# Patient Record
Sex: FEMALE | Race: BLACK OR AFRICAN AMERICAN | NOT HISPANIC OR LATINO | Employment: FULL TIME | URBAN - METROPOLITAN AREA
[De-identification: names, ages, dates, MRNs, and addresses within clinical notes are randomized per-mention and may not be internally consistent; named-entity substitution may affect disease eponyms.]

---

## 2017-04-04 ENCOUNTER — HOSPITAL ENCOUNTER (EMERGENCY)
Facility: HOSPITAL | Age: 47
Discharge: HOME | End: 2017-04-04
Attending: EMERGENCY MEDICINE
Payer: MEDICAID

## 2017-04-04 VITALS
DIASTOLIC BLOOD PRESSURE: 95 MMHG | HEIGHT: 67 IN | TEMPERATURE: 100 F | RESPIRATION RATE: 20 BRPM | SYSTOLIC BLOOD PRESSURE: 165 MMHG | WEIGHT: 290 LBS | BODY MASS INDEX: 45.52 KG/M2 | HEART RATE: 120 BPM

## 2017-04-04 DIAGNOSIS — J02.0 STREP PHARYNGITIS: Primary | ICD-10-CM

## 2017-04-04 LAB
B-HCG UR QL: NEGATIVE
BACTERIA #/AREA URNS AUTO: ABNORMAL /HPF
BILIRUB UR QL STRIP: NEGATIVE
CLARITY UR REFRACT.AUTO: CLEAR
COLOR UR AUTO: ABNORMAL
DEPRECATED S PYO AG THROAT QL EIA: POSITIVE
FLUAV AG SPEC QL IA: NEGATIVE
FLUBV AG SPEC QL IA: NEGATIVE
GLUCOSE UR QL STRIP: NEGATIVE
HGB UR QL STRIP: ABNORMAL
KETONES UR QL STRIP: NEGATIVE
LEUKOCYTE ESTERASE UR QL STRIP: ABNORMAL
MICROSCOPIC COMMENT: ABNORMAL
NITRITE UR QL STRIP: NEGATIVE
PH UR STRIP: 7 [PH] (ref 5–8)
PROT UR QL STRIP: NEGATIVE
RBC #/AREA URNS AUTO: 12 /HPF (ref 0–4)
SP GR UR STRIP: 1.01 (ref 1–1.03)
SPECIMEN SOURCE: NORMAL
SQUAMOUS #/AREA URNS AUTO: ABNORMAL /HPF
URN SPEC COLLECT METH UR: ABNORMAL
UROBILINOGEN UR STRIP-ACNC: NEGATIVE EU/DL
WBC #/AREA URNS AUTO: 3 /HPF (ref 0–5)

## 2017-04-04 PROCEDURE — 81000 URINALYSIS NONAUTO W/SCOPE: CPT

## 2017-04-04 PROCEDURE — 87400 INFLUENZA A/B EACH AG IA: CPT | Mod: 59

## 2017-04-04 PROCEDURE — 99283 EMERGENCY DEPT VISIT LOW MDM: CPT | Mod: 25

## 2017-04-04 PROCEDURE — 87880 STREP A ASSAY W/OPTIC: CPT

## 2017-04-04 PROCEDURE — 81025 URINE PREGNANCY TEST: CPT

## 2017-04-04 PROCEDURE — 63600175 PHARM REV CODE 636 W HCPCS: Performed by: EMERGENCY MEDICINE

## 2017-04-04 PROCEDURE — 96372 THER/PROPH/DIAG INJ SC/IM: CPT

## 2017-04-04 RX ORDER — HYDROCODONE BITARTRATE AND ACETAMINOPHEN 5; 325 MG/1; MG/1
1 TABLET ORAL EVERY 6 HOURS PRN
Qty: 12 TABLET | Refills: 0 | Status: SHIPPED | OUTPATIENT
Start: 2017-04-04 | End: 2018-07-10

## 2017-04-04 RX ADMIN — PENICILLIN G BENZATHINE 1.2 MILLION UNITS: 1200000 INJECTION, SUSPENSION INTRAMUSCULAR at 09:04

## 2017-04-04 NOTE — ED AVS SNAPSHOT
[facility]  [address]  [place] [address]               Hernando Lugo   2017  8:59 PM   ED    Description:  Female : 1970   Department:  [facility]           Your Care was Coordinated By:     Provider Role From To    Zandra Ziegler MD Attending Provider 17 --      Reason for Visit     Abdominal Pain           Diagnoses this Visit        Comments    Strep pharyngitis    -  Primary       ED Disposition     ED Disposition Condition Comment    Discharge             To Do List           Follow-up Information     Follow up with [facility] In 1 week(s).    Specialties:  Neurosurgery, Plastic Surgery, Podiatry, Surgical Oncology, Allergy, Cardiothoracic Surgery, Otolaryngology, Gastroenterology, Breast Surgery, Oral Surgery, Oral and Maxillofacial Surgery, Cardiology, Bariatrics, Internal Medicine, Family Medicine, Colon and Rectal Surgery, Dental General Practice, Gynecology, Orthopedic Surgery, Genetics, Endocrinology, Vascular Surgery, Physical Medicine and Rehabilitation, Urology, Neurology, Dermatology, Rheumatology    Contact information:    [address] [place] [address]  [phone]         These Medications        Disp Refills Start End    hydrocodone-acetaminophen 5-325mg (NORCO) 5-325 mg per tablet 12 tablet 0 2017     Take 1 tablet by mouth every 6 (six) hours as needed for Pain. - Oral      [facility] On Call     [facility] On Call Nurse Care Line - 24/ Assistance  Unless otherwise directed by your provider, please contact [facility] On-Call, our nurse care line that is available for / assistance.     Registered nurses in the [facility] provide: appointment scheduling, clinical advisement, health education, and other advisory services.  Call: [phone] (toll free)               Medications           START taking these NEW medications        Refills     hydrocodone-acetaminophen 5-325mg (NORCO) 5-325 mg per tablet 0    Sig: Take 1 tablet by mouth every 6 (six) hours as needed for Pain.    Class: Print    Route: Oral      These medications were administered today        Dose Freq    penicillin G benzathine (BICILLIN LA) injection 1.2 Million Units 1.2 Million Units ED 1 Time    Sig: Inject 2 mLs (1.2 Million Units total) into the muscle ED 1 Time.    Class: Normal    Route: Intramuscular      STOP taking these medications     lisinopril (PRINIVIL,ZESTRIL) 40 MG tablet Take 50 mg by mouth once daily.    ranitidine (ZANTAC) 150 MG tablet Take 150 mg by mouth 2 (two) times daily.    ketorolac (TORADOL) 10 mg tablet Take 1 tablet (10 mg total) by mouth every 6 (six) hours.    meloxicam (MOBIC) 15 MG tablet Take 1 tablet (15 mg total) by mouth once daily. With a meal for joint pain    ibuprofen (ADVIL,MOTRIN) 800 MG tablet Take 1 tablet (800 mg total) by mouth every 6 (six) hours as needed for Pain.           Verify that the below list of medications is an accurate representation of the medications you are currently taking.  If none reported, the list may be blank. If incorrect, please contact your healthcare provider. Carry this list with you in case of emergency.           Current Medications     gabapentin (NEURONTIN) 100 MG capsule One po hs for 3 days then 2 hs    hydrochlorothiazide (HYDRODIURIL) 25 MG tablet Take 25 mg by mouth once daily.     losartan (COZAAR) 100 MG tablet Take 100 mg by mouth once daily.    acetaminophen (TYLENOL) 500 MG tablet Take 2 tablets (1,000 mg total) by mouth 3 (three) times daily as needed for Pain.    hydrocodone-acetaminophen 5-325mg (NORCO) 5-325 mg per tablet Take 1 tablet by mouth every 6 (six) hours as needed for Pain.           Clinical Reference Information           Your Vitals Were     BP Pulse Temp Resp Height Weight    165/95 (BP Location: Right arm, Patient Position: Sitting, BP Method: Automatic) 120 100.4 °F (38 °C) 20  "5' 7" (1.702 m) 131.5 kg (290 lb)    BMI                45.42 kg/m2          Allergies as of 4/4/2017     No Known Allergies      Immunizations Administered on Date of Encounter - 4/4/2017     None      ED Micro, Lab, POCT     Start Ordered       Status Ordering Provider    04/04/17 2121 04/04/17 2120  Rapid strep screen  STAT      Final result     04/04/17 2121 04/04/17 2120  Influenza antigen Nasopharyngeal Swab  STAT      Final result     04/04/17 2104 04/04/17 2103  Urinalysis  STAT      Final result     04/04/17 2104 04/04/17 2103  UPT (Pregnancy, urine rapid)  STAT      Final result     04/04/17 2103 04/04/17 2103  Urinalysis Microscopic  Once      Final result       ED Imaging Orders     None        Discharge Instructions           Pharyngitis: Strep (Confirmed)       You have had a positive test for strep throat. Strep throat is a contagious illness. It is spread by coughing, kissing or by touching others after touching your mouth or nose. Symptoms include throat pain which is worse with swallowing, aching all over, headache and fever. It is treated with antibiotic medication. This should help you start to feel better within 1-2 days.  Home care  · Rest at home. Drink plenty of fluids to avoid dehydration.  · No work or school for the first 2 days of taking the antibiotics. After this time, you will not be contagious. You can then return to school or work if you are feeling better.   · The antibiotic medication must be taken for the full 10 days, even if you feel better. This is very important to ensure the infection is treated. It is also important to prevent drug-resistent organisms from developing. If you were given an antibiotic shot, no more antibiotics are needed.  · You may use acetaminophen (Tylenol) or ibuprofen (Motrin, Advil) to control pain or fever, unless another medicine was prescribed for this. (NOTE: If you have chronic liver or kidney disease or ever had a stomach ulcer or GI bleeding, talk " with your doctor before using these medicines.)  · Throat lozenges or sprays (such as Chloraseptic) help reduce pain. Gargling with warm salt water will also reduce throat pain. Dissolve 1/2 teaspoon of salt in 1 glass of warm water. This may be useful just before meals.   · Soft foods are okay. Avoid salty or spicy foods.  Follow-up care  Follow up with your healthcare provider or our staff if you are not improving over the next week.  When to seek medical advice  Call your healthcare provider right away if any of these occur:  · Fever as directed by your doctor   · New or worsening ear pain, sinus pain, or headache  · Painful lumps in the back of neck  · Stiff neck  · Lymph nodes are getting larger or becoming soft in the middle  · Inability to swallow liquids, excessive drooling, or inability to open mouth wide due to throat pain  · Signs of dehydration (very dark urine or no urine, sunken eyes, dizziness)  · Trouble breathing or noisy breathing  · Muffled voice  · New rash  Date Last Reviewed: 4/13/2015  © 2012-8691 [employer]. [address] [place] [address]. All rights reserved. This information is not intended as a substitute for professional medical care. Always follow your healthcare professional's instructions.          [facility] Sign-Up     Activating your [facility] account is as easy as 1-2-3!     1) Visit [email or web address], select Sign Up Now, enter this activation code and your date of birth, then select Next.  [email or web address]  Expires: 5/19/2017 10:08 PM      2) Create a username and password to use when you visit [facility] in the future and select a security question in case you lose your password and select Next.    3) Enter your e-mail address and click Sign Up!    Additional Information  If you have questions, please e-mail [email or web address] or call [phone] to talk to our [facility] staff. Remember, [facility] is NOT to be used for urgent needs. For medical  emergencies, dial 911.          [facility] complies with applicable Federal civil rights laws and does not discriminate on the basis of race, color, national origin, age, disability, or sex.        Language Assistance Services     ATTENTION: Language assistance services are available, free of charge. Please call [phone].      ATENCIÓN: Si habla español, tiene a hartman disposición servicios gratuitos de asistencia lingüística. Llame al [phone].     SIMON Ý: N?u b?n nói Ti?ng Vi?t, có các d?ch v? h? tr? ngôn ng? mi?n phí dành cho b?n. G?i s? [phone].

## 2017-04-05 NOTE — DISCHARGE INSTRUCTIONS
Pharyngitis: Strep (Confirmed)       You have had a positive test for strep throat. Strep throat is a contagious illness. It is spread by coughing, kissing or by touching others after touching your mouth or nose. Symptoms include throat pain which is worse with swallowing, aching all over, headache and fever. It is treated with antibiotic medication. This should help you start to feel better within 1-2 days.  Home care  · Rest at home. Drink plenty of fluids to avoid dehydration.  · No work or school for the first 2 days of taking the antibiotics. After this time, you will not be contagious. You can then return to school or work if you are feeling better.   · The antibiotic medication must be taken for the full 10 days, even if you feel better. This is very important to ensure the infection is treated. It is also important to prevent drug-resistent organisms from developing. If you were given an antibiotic shot, no more antibiotics are needed.  · You may use acetaminophen (Tylenol) or ibuprofen (Motrin, Advil) to control pain or fever, unless another medicine was prescribed for this. (NOTE: If you have chronic liver or kidney disease or ever had a stomach ulcer or GI bleeding, talk with your doctor before using these medicines.)  · Throat lozenges or sprays (such as Chloraseptic) help reduce pain. Gargling with warm salt water will also reduce throat pain. Dissolve 1/2 teaspoon of salt in 1 glass of warm water. This may be useful just before meals.   · Soft foods are okay. Avoid salty or spicy foods.  Follow-up care  Follow up with your healthcare provider or our staff if you are not improving over the next week.  When to seek medical advice  Call your healthcare provider right away if any of these occur:  · Fever as directed by your doctor   · New or worsening ear pain, sinus pain, or headache  · Painful lumps in the back of neck  · Stiff neck  · Lymph nodes are getting larger or becoming soft in the  middle  · Inability to swallow liquids, excessive drooling, or inability to open mouth wide due to throat pain  · Signs of dehydration (very dark urine or no urine, sunken eyes, dizziness)  · Trouble breathing or noisy breathing  · Muffled voice  · New rash  Date Last Reviewed: 4/13/2015  © 1528-6377 [employer]. [address] [place] [address], [place] [address]. All rights reserved. This information is not intended as a substitute for professional medical care. Always follow your healthcare professional's instructions.

## 2017-04-05 NOTE — ED PROVIDER NOTES
"Encounter Date: 4/4/2017       History     Chief Complaint   Patient presents with    Abdominal Pain     Epigastric abdominal pain "burning" since 1200 today, no n/v/d.  Also c/o headache and bilateral posterior lower extremity pain, no hx of recent trauma     Review of patient's allergies indicates:  No Known Allergies  HPI Comments: Patient states that she's been having a sore throat since this morning.  She states that this is causing her to have trouble swallowing and eating and from this she is having abdominal pain because she is hungry.    Patient is a 46 y.o. female presenting with the following complaint: sore throat. The history is provided by the patient.   Sore Throat   This is a new problem. The current episode started 6 to 12 hours ago. The problem occurs constantly. The problem has not changed since onset.Pertinent negatives include no chest pain, no abdominal pain, no headaches and no shortness of breath. The symptoms are aggravated by swallowing. Nothing relieves the symptoms.     Past Medical History:   Diagnosis Date    GERD (gastroesophageal reflux disease)     Hypertension      Past Surgical History:   Procedure Laterality Date    CHOLECYSTECTOMY      TUBAL LIGATION       History reviewed. No pertinent family history.  Social History   Substance Use Topics    Smoking status: Never Smoker    Smokeless tobacco: None    Alcohol use No     Review of Systems   HENT: Positive for sore throat.    Respiratory: Negative for shortness of breath.    Cardiovascular: Negative for chest pain.   Gastrointestinal: Negative for abdominal pain.   Neurological: Negative for headaches.   All other systems reviewed and are negative.      Physical Exam   Initial Vitals   BP Pulse Resp Temp SpO2   04/04/17 2100 04/04/17 2103 04/04/17 2103 04/04/17 2103 --   153/104 120 20 100.4 °F (38 °C)      Physical Exam    Nursing note and vitals reviewed.  Constitutional: She appears well-developed and well-nourished. "   HENT:   Head: Normocephalic and atraumatic.   Mouth/Throat: Oropharyngeal exudate, posterior oropharyngeal edema and posterior oropharyngeal erythema present.   Eyes: EOM are normal.   Neck: Normal range of motion. Neck supple.   Cardiovascular: Normal rate, regular rhythm, normal heart sounds and intact distal pulses.   Pulmonary/Chest: Breath sounds normal.   Abdominal: Soft.   Musculoskeletal: Normal range of motion.   Neurological: She is alert and oriented to person, place, and time.   Skin: Skin is warm and dry.   Psychiatric: She has a normal mood and affect. Her behavior is normal. Judgment and thought content normal.         ED Course   Procedures  Labs Reviewed   THROAT SCREEN, RAPID - Abnormal; Notable for the following:        Result Value    Rapid Strep A Screen Positive (*)     All other components within normal limits   URINALYSIS - Abnormal; Notable for the following:     Occult Blood UA 3+ (*)     Leukocytes, UA 2+ (*)     All other components within normal limits   URINALYSIS MICROSCOPIC - Abnormal; Notable for the following:     RBC, UA 12 (*)     All other components within normal limits   PREGNANCY TEST, URINE RAPID   INFLUENZA A AND B ANTIGEN             Medical Decision Making:   Clinical Tests:   Lab Tests: Ordered and Reviewed                   ED Course     Clinical Impression:   The encounter diagnosis was Strep pharyngitis.    Disposition:   Disposition: Discharged  Condition: Stable       Zandra Ziegler MD  04/04/17 5393

## 2017-07-15 ENCOUNTER — HOSPITAL ENCOUNTER (EMERGENCY)
Facility: HOSPITAL | Age: 47
Discharge: HOME | End: 2017-07-16
Attending: EMERGENCY MEDICINE
Payer: MEDICAID

## 2017-07-15 VITALS
TEMPERATURE: 98 F | HEIGHT: 66 IN | BODY MASS INDEX: 46.61 KG/M2 | DIASTOLIC BLOOD PRESSURE: 95 MMHG | HEART RATE: 75 BPM | OXYGEN SATURATION: 98 % | SYSTOLIC BLOOD PRESSURE: 179 MMHG | WEIGHT: 290 LBS | RESPIRATION RATE: 20 BRPM

## 2017-07-15 DIAGNOSIS — J02.9 VIRAL PHARYNGITIS: Primary | ICD-10-CM

## 2017-07-15 LAB — DEPRECATED S PYO AG THROAT QL EIA: NEGATIVE

## 2017-07-15 PROCEDURE — 63600175 PHARM REV CODE 636 W HCPCS: Performed by: EMERGENCY MEDICINE

## 2017-07-15 PROCEDURE — 87081 CULTURE SCREEN ONLY: CPT

## 2017-07-15 PROCEDURE — 87880 STREP A ASSAY W/OPTIC: CPT

## 2017-07-15 PROCEDURE — 96372 THER/PROPH/DIAG INJ SC/IM: CPT

## 2017-07-15 PROCEDURE — 99283 EMERGENCY DEPT VISIT LOW MDM: CPT | Mod: 25

## 2017-07-15 RX ORDER — METFORMIN HYDROCHLORIDE 500 MG/1
500 TABLET ORAL 2 TIMES DAILY WITH MEALS
COMMUNITY

## 2017-07-15 RX ORDER — DEXAMETHASONE SODIUM PHOSPHATE 4 MG/ML
8 INJECTION, SOLUTION INTRA-ARTICULAR; INTRALESIONAL; INTRAMUSCULAR; INTRAVENOUS; SOFT TISSUE
Status: COMPLETED | OUTPATIENT
Start: 2017-07-15 | End: 2017-07-15

## 2017-07-15 RX ADMIN — DEXAMETHASONE SODIUM PHOSPHATE 8 MG: 4 INJECTION, SOLUTION INTRAMUSCULAR; INTRAVENOUS at 11:07

## 2017-07-16 NOTE — ED PROVIDER NOTES
Encounter Date: 7/15/2017       History     Chief Complaint   Patient presents with    Sore Throat    Nasal Congestion     The history is provided by the patient.   Sore Throat   This is a new problem. The current episode started yesterday. The problem occurs constantly. The problem has not changed since onset.Pertinent negatives include no chest pain, no abdominal pain, no headaches and no shortness of breath. The symptoms are aggravated by swallowing and coughing. Nothing relieves the symptoms. She has tried nothing for the symptoms.     Review of patient's allergies indicates:  No Known Allergies  Past Medical History:   Diagnosis Date    Diabetes mellitus     GERD (gastroesophageal reflux disease)     Hypertension      Past Surgical History:   Procedure Laterality Date    CHOLECYSTECTOMY      TUBAL LIGATION       History reviewed. No pertinent family history.  Social History   Substance Use Topics    Smoking status: Never Smoker    Smokeless tobacco: Never Used    Alcohol use No     Review of Systems   HENT: Positive for sore throat.    Respiratory: Negative for shortness of breath.    Cardiovascular: Negative for chest pain.   Gastrointestinal: Negative for abdominal pain.   Neurological: Negative for headaches.   All other systems reviewed and are negative.      Physical Exam     Initial Vitals [07/15/17 2238]   BP Pulse Resp Temp SpO2   (!) 164/100 78 20 98.2 °F (36.8 °C) 98 %      MAP       121.33         Physical Exam    Nursing note and vitals reviewed.  Constitutional: She appears well-developed and well-nourished.   HENT:   Head: Normocephalic and atraumatic.   Mouth/Throat: Posterior oropharyngeal edema present. No oropharyngeal exudate, posterior oropharyngeal erythema or tonsillar abscesses.   Eyes: EOM are normal.   Neck: Normal range of motion. Neck supple.   Cardiovascular: Normal rate, regular rhythm, normal heart sounds and intact distal pulses.   Pulmonary/Chest: Breath sounds normal.    Abdominal: Soft.   Musculoskeletal: Normal range of motion.   Neurological: She is alert and oriented to person, place, and time.   Skin: Skin is warm and dry. Capillary refill takes less than 2 seconds.   Psychiatric: She has a normal mood and affect. Her behavior is normal. Judgment and thought content normal.         ED Course   Procedures  Labs Reviewed   THROAT SCREEN, RAPID   CULTURE, STREP A,  THROAT             Medical Decision Making:   Clinical Tests:   Lab Tests: Ordered and Reviewed                   ED Course     Clinical Impression:   The encounter diagnosis was Viral pharyngitis.    Disposition:   Disposition: Discharged  Condition: Stable                        Zandra Ziegler MD  07/15/17 7267

## 2017-07-18 LAB — BACTERIA THROAT CULT: NORMAL

## 2018-01-19 ENCOUNTER — HOSPITAL ENCOUNTER (EMERGENCY)
Facility: HOSPITAL | Age: 48
Discharge: HOME | End: 2018-01-19
Attending: EMERGENCY MEDICINE
Payer: MEDICAID

## 2018-01-19 VITALS
TEMPERATURE: 97 F | DIASTOLIC BLOOD PRESSURE: 95 MMHG | BODY MASS INDEX: 57.52 KG/M2 | WEIGHT: 293 LBS | HEIGHT: 60 IN | HEART RATE: 71 BPM | SYSTOLIC BLOOD PRESSURE: 181 MMHG | RESPIRATION RATE: 20 BRPM | OXYGEN SATURATION: 100 %

## 2018-01-19 DIAGNOSIS — R07.9 CHEST PAIN: ICD-10-CM

## 2018-01-19 DIAGNOSIS — E66.01 MORBID OBESITY: ICD-10-CM

## 2018-01-19 DIAGNOSIS — I10 POORLY-CONTROLLED HYPERTENSION: Primary | ICD-10-CM

## 2018-01-19 LAB
B-HCG UR QL: NEGATIVE
D DIMER PPP FEU-MCNC: 232 NG/ML

## 2018-01-19 PROCEDURE — 99284 EMERGENCY DEPT VISIT MOD MDM: CPT

## 2018-01-19 PROCEDURE — 85379 FIBRIN DEGRADATION QUANT: CPT

## 2018-01-19 PROCEDURE — 81025 URINE PREGNANCY TEST: CPT

## 2018-01-19 PROCEDURE — 93005 ELECTROCARDIOGRAM TRACING: CPT

## 2018-01-19 PROCEDURE — 93010 ELECTROCARDIOGRAM REPORT: CPT | Mod: ,,, | Performed by: INTERNAL MEDICINE

## 2018-01-19 RX ORDER — HYDROCHLOROTHIAZIDE 25 MG/1
25 TABLET ORAL DAILY
Qty: 30 TABLET | Refills: 0 | Status: SHIPPED | OUTPATIENT
Start: 2018-01-19 | End: 2022-05-11 | Stop reason: SDUPTHER

## 2018-01-19 RX ORDER — LOSARTAN POTASSIUM 100 MG/1
100 TABLET ORAL DAILY
Qty: 30 TABLET | Refills: 0 | Status: SHIPPED | OUTPATIENT
Start: 2018-01-19 | End: 2021-07-25 | Stop reason: CLARIF

## 2018-01-19 NOTE — ED PROVIDER NOTES
Encounter Date: 1/19/2018       History     Chief Complaint   Patient presents with    Abdominal Pain     Intermittent sharp epigastric pain since yesterday, worse with lying down, nothing makes better.  Denies any n/v/d, no cough/fever at home.  Pt has been noncompliant with RX for greater than a week    Leg Pain     Bilateral LE pain that started same time as epigastric pain, no swelling/redness, able to ambulate without difficulty     This patient is a 47-year-old female.  Presents to the emergency department complaining of pain in the right calf, and in her chest.  Started at 5 PM this evening while she was driving.  No prior history of DVT or PE.  The pain in her right leg is a sharp pain.  No associated swelling.  Pain in her chest is in the retrosternal region.  No associated diaphoresis, palpitations, shortness of breath, lightheadedness, or loss of consciousness.    She is morbidly obese.  No history of DVT or PE previously.  No recent surgery or trauma.  No other identified risk factors for DVT or PE.  No history of heart disease.            Review of patient's allergies indicates:  No Known Allergies  Past Medical History:   Diagnosis Date    Diabetes mellitus     GERD (gastroesophageal reflux disease)     Hypertension      Past Surgical History:   Procedure Laterality Date    CHOLECYSTECTOMY      TUBAL LIGATION       History reviewed. No pertinent family history.  Social History   Substance Use Topics    Smoking status: Never Smoker    Smokeless tobacco: Never Used    Alcohol use No     Review of Systems   Constitutional: Negative for chills and fever.   HENT: Negative.    Respiratory: Negative for cough and shortness of breath.    Cardiovascular: Positive for chest pain. Negative for palpitations and leg swelling.   Gastrointestinal: Negative.    Endocrine: Negative.    Genitourinary: Negative.    Musculoskeletal:        Right calf/leg pain   Skin: Negative for rash.   Neurological: Negative.     Hematological: Negative.        Physical Exam     Initial Vitals [01/19/18 0338]   BP Pulse Resp Temp SpO2   -- 79 18 98.5 °F (36.9 °C) 100 %      MAP       --         Physical Exam    Nursing note and vitals reviewed.  Constitutional: She appears well-developed and well-nourished. She is not diaphoretic. No distress.   Morbidly obese female in no apparent distress   HENT:   Head: Normocephalic and atraumatic.   Right Ear: External ear normal.   Left Ear: External ear normal.   Mouth/Throat: Oropharynx is clear and moist.   Eyes: Conjunctivae are normal. Pupils are equal, round, and reactive to light. No scleral icterus.   Neck:   Cannot see neck veins due to body habitus   Cardiovascular: Normal rate, regular rhythm, normal heart sounds and intact distal pulses.   No murmur heard.  Pulmonary/Chest: Breath sounds normal. No respiratory distress. She has no wheezes. She exhibits tenderness.   Abdominal: Soft. She exhibits no distension. There is no tenderness.   Musculoskeletal: She exhibits no edema or tenderness.   Obese lower extremities but no pitting edema   Neurological: She is alert. She has normal strength. No sensory deficit.   Skin: Skin is warm and dry. No rash noted.         ED Course   Procedures  Labs Reviewed   D DIMER - Abnormal; Notable for the following:        Result Value    D-Dimer 232 (*)     All other components within normal limits   PREGNANCY TEST, URINE RAPID     EKG Readings: (Independently Interpreted)   I independently reviewed the EKG tracing.  It shows normal sinus rhythm, heart rate 71, no acute ST elevation or depression, normal EKG       X-Rays:   Independently Interpreted Readings:   Other Readings:  I independently reviewed the chest x-ray.  Heart size is normal, no infiltrate or effusion, no evidence of pneumothorax    Medical Decision Making:   Initial Assessment:   This patient is morbidly obese, and presents to the emergency department complaining of right calf pain, and  onset of chest pain, started today.  Her d-dimer is 232, essentially normal, so CTA of the chest or ultrasound leg are not necessary.  The patient says that the primary reason she came to the hospital today was because she ran out of her blood pressure medication, and missed her primary care appointment to get a medication refill, so a refill of losartan HCTZ were provided.  He should follow-up with her primary care doctor within the next month for further medication refills and have her blood pressure rechecked                   ED Course as of Jan 19 0505   Fri Jan 19, 2018   0434 D-Dimer: (!) 232 [RS]      ED Course User Index  [RS] Ryder Keyes MD     Clinical Impression:   The primary encounter diagnosis was Poorly-controlled hypertension. Diagnoses of Chest pain and Morbid obesity were also pertinent to this visit.    Disposition:   Disposition: Discharged  Condition: Stable                        Ryder Keyes MD  01/19/18 [id number]

## 2018-01-19 NOTE — ED NOTES
Pt resting quietly, NAD noted, respirations even/unlabored, does not appear to be in any discomfort.  RT notified of need for EKG, will continue to closely monitor.

## 2018-07-10 ENCOUNTER — HOSPITAL ENCOUNTER (EMERGENCY)
Facility: HOSPITAL | Age: 48
Discharge: HOME | End: 2018-07-10
Attending: FAMILY MEDICINE
Payer: MEDICAID

## 2018-07-10 VITALS
WEIGHT: 293 LBS | RESPIRATION RATE: 16 BRPM | DIASTOLIC BLOOD PRESSURE: 71 MMHG | TEMPERATURE: 98 F | OXYGEN SATURATION: 100 % | HEIGHT: 63 IN | HEART RATE: 92 BPM | SYSTOLIC BLOOD PRESSURE: 143 MMHG | BODY MASS INDEX: 51.91 KG/M2

## 2018-07-10 DIAGNOSIS — H11.32 SUBCONJUNCTIVAL HEMORRHAGE OF LEFT EYE: Primary | ICD-10-CM

## 2018-07-10 PROCEDURE — 99283 EMERGENCY DEPT VISIT LOW MDM: CPT

## 2018-07-10 RX ORDER — AMLODIPINE BESYLATE 5 MG/1
5 TABLET ORAL DAILY
COMMUNITY
End: 2019-05-09

## 2018-07-10 RX ORDER — AMLODIPINE BESYLATE 10 MG/1
10 TABLET ORAL DAILY
COMMUNITY
End: 2022-05-11 | Stop reason: SDUPTHER

## 2018-07-11 NOTE — ED NOTES
Pt stated she cried a lot on yesterday d/t death of a loved one at the hospital; pt stated her left eye was a little red on yesterday & today has worsen with throbbing rate pain 7/10; pt stated she didn't take anything for pain; denies any visual changes; no drainage noted;

## 2018-07-11 NOTE — ED PROVIDER NOTES
Encounter Date: 7/10/2018       History     Chief Complaint   Patient presents with    Eye Problem     Broken blood vessel in the left side of left eye. Crying a lot yesterday due to death of family member. +throbbing. Denies decrease in vision     47-year-old female presents  to the emergency department for evaluation of 2 day history of left eye redness.  She states that she noticed a small area of redness last night after crying.  She reports that this morning the redness seems to have gotten bigger.  She denies any eye pain, vision changes, discharge, redness/swelling/pain to the upper or lower eyelids.  She states that her uncle  last night and she and her family were crying hard for several hours.  No treatment  was attempted prior to arrival.  She denies wearing contact lenses or glasses.          Review of patient's allergies indicates:  No Known Allergies  Past Medical History:   Diagnosis Date    Diabetes mellitus     GERD (gastroesophageal reflux disease)     Hypertension      Past Surgical History:   Procedure Laterality Date    CHOLECYSTECTOMY      TUBAL LIGATION       No family history on file.  Social History   Substance Use Topics    Smoking status: Never Smoker    Smokeless tobacco: Never Used    Alcohol use No     Review of Systems   Constitutional: Negative for activity change, appetite change and fever.   HENT: Negative for congestion, ear discharge, ear pain, facial swelling, mouth sores, sinus pressure, sore throat and trouble swallowing.    Eyes: Positive for redness. Negative for photophobia, pain, discharge, itching and visual disturbance.   Respiratory: Negative for cough and shortness of breath.    Cardiovascular: Negative for chest pain.   Gastrointestinal: Negative for abdominal pain, constipation, diarrhea, nausea and vomiting.   Genitourinary: Negative for dysuria, flank pain, frequency and pelvic pain.   Musculoskeletal: Negative for back pain and neck pain.   Skin:  Negative for rash.   Neurological: Negative for dizziness, syncope, weakness, light-headedness, numbness and headaches.       Physical Exam     Initial Vitals [07/10/18 2004]   BP Pulse Resp Temp SpO2   (!) 143/71 92 16 98.1 °F (36.7 °C) 100 %      MAP       --         Physical Exam    Nursing note and vitals reviewed.  Constitutional: She appears well-developed and well-nourished. She is not diaphoretic. No distress.   HENT:   Head: Normocephalic and atraumatic.   Right Ear: External ear normal.   Left Ear: External ear normal.   Nose: Nose normal.   Mouth/Throat: Oropharynx is clear and moist.   Eyes: EOM and lids are normal. Pupils are equal, round, and reactive to light. Right eye exhibits no chemosis, no discharge and no exudate. No foreign body present in the right eye. Left eye exhibits no chemosis, no discharge and no exudate. No foreign body present in the left eye. Right conjunctiva is not injected. Right conjunctiva has no hemorrhage. Left conjunctiva is not injected. Left conjunctiva has a hemorrhage.       Neck: Normal range of motion. Neck supple.   Cardiovascular: Normal rate, regular rhythm and normal heart sounds. Exam reveals no gallop and no friction rub.    No murmur heard.  Pulmonary/Chest: Breath sounds normal. No respiratory distress. She has no wheezes. She has no rhonchi. She has no rales. She exhibits no tenderness.   Abdominal: Soft. Bowel sounds are normal. There is no tenderness.   Lymphadenopathy:     She has no cervical adenopathy.   Neurological: She is alert and oriented to person, place, and time. No cranial nerve deficit.   Skin: Skin is warm and dry.   Psychiatric: She has a normal mood and affect.         ED Course   Procedures  Labs Reviewed - No data to display       Imaging Results    None          Medical Decision Making:   Initial Assessment:   47-year-old female presents for evaluation of left eye redness.  Physical exam reveals a nontoxic-appearing female in no acute  distress.  Patient is afebrile vital signs are within normal limits.  Neurological exam reveals an alert and oriented patient.  No periorbital erythema or edema noted.  Examination of the eyes reveals a small subconjunctival hemorrhage noted to the lateral aspect of the left eye.  No hyphema or anterior chamber bulge noted.  No foreign bodies noted.  Lungs clear to auscultation bilaterally.  Differential Diagnosis:   Subconjunctival hemorrhage.    I carefully considered but doubt serious eye etiology or injury including hyphema, open globe, embedded foreign body or periorbital cellulitis  ED Management:  Discussed this finding at length with the patient verbalizes understanding and agreement course of treatment.  Instructed patient to follow up with her primary care provider for reevaluation within 3 days.  She was instructed to return to the emergency department immediately for any new or worsening symptoms.                      Clinical Impression:   The encounter diagnosis was Subconjunctival hemorrhage of left eye.                             Christina Lozano PA-C  07/10/18 8946

## 2018-07-11 NOTE — DISCHARGE INSTRUCTIONS
You were found to have a subconjunctival hemorrhage of the left eye.  This will resolve without treatment in 1-2 weeks.  You are instructed to follow-up with your primary care provider for reevaluation.  You are instructed to return to the emergency department immediately for any new or worsening symptoms.

## 2019-05-09 ENCOUNTER — HOSPITAL ENCOUNTER (EMERGENCY)
Facility: HOSPITAL | Age: 49
Discharge: HOME | End: 2019-05-09
Attending: SURGERY
Payer: MEDICAID

## 2019-05-09 VITALS
SYSTOLIC BLOOD PRESSURE: 174 MMHG | DIASTOLIC BLOOD PRESSURE: 88 MMHG | TEMPERATURE: 98 F | HEART RATE: 62 BPM | OXYGEN SATURATION: 97 % | RESPIRATION RATE: 18 BRPM

## 2019-05-09 DIAGNOSIS — S16.1XXA ACUTE STRAIN OF NECK MUSCLE, INITIAL ENCOUNTER: Primary | ICD-10-CM

## 2019-05-09 DIAGNOSIS — I16.0 HYPERTENSIVE URGENCY: ICD-10-CM

## 2019-05-09 LAB — POCT GLUCOSE: 109 MG/DL (ref 70–110)

## 2019-05-09 PROCEDURE — 99284 EMERGENCY DEPT VISIT MOD MDM: CPT | Mod: ER

## 2019-05-09 PROCEDURE — 25000003 PHARM REV CODE 250: Mod: ER | Performed by: SURGERY

## 2019-05-09 PROCEDURE — 82962 GLUCOSE BLOOD TEST: CPT | Mod: ER

## 2019-05-09 RX ORDER — CLONIDINE HYDROCHLORIDE 0.2 MG/1
0.2 TABLET ORAL
Status: COMPLETED | OUTPATIENT
Start: 2019-05-09 | End: 2019-05-09

## 2019-05-09 RX ORDER — HYDROCODONE BITARTRATE AND ACETAMINOPHEN 7.5; 325 MG/1; MG/1
1 TABLET ORAL EVERY 6 HOURS PRN
Qty: 10 TABLET | Refills: 0 | Status: SHIPPED | OUTPATIENT
Start: 2019-05-09

## 2019-05-09 RX ORDER — HYDROCODONE BITARTRATE AND ACETAMINOPHEN 10; 325 MG/1; MG/1
1 TABLET ORAL
Status: COMPLETED | OUTPATIENT
Start: 2019-05-09 | End: 2019-05-09

## 2019-05-09 RX ORDER — CLONIDINE HYDROCHLORIDE 0.1 MG/1
0.2 TABLET ORAL 2 TIMES DAILY
Qty: 30 TABLET | Refills: 0 | Status: SHIPPED | OUTPATIENT
Start: 2019-05-09 | End: 2020-05-08

## 2019-05-09 RX ADMIN — CLONIDINE HYDROCHLORIDE 0.2 MG: 0.2 TABLET ORAL at 04:05

## 2019-05-09 RX ADMIN — HYDROCODONE BITARTRATE AND ACETAMINOPHEN 1 TABLET: 10; 325 TABLET ORAL at 04:05

## 2019-05-09 NOTE — ED PROVIDER NOTES
Encounter Date: 5/9/2019       History     Chief Complaint   Patient presents with    Neck Pain     left sided neck pain started at 12 today. Denies injury.     Hypertension     Patient has left-sided neck tightness which started at noon today which she thinks is due to a muscle strain   she can't move her neck to the left or to the right without discomfort.  She has a history of hypertension she takes losartan in the morning and amlodipine at night she does not have a headache or chest pain    The history is provided by the patient.   Neck Pain    This is a new problem. The current episode started 3 to 5 hours ago. The problem occurs intermittently. The problem has been unchanged. The pain is associated with a recent injury. The pain is present in the left side. The quality of the pain is described as stabbing. The pain radiates to the left shoulder. The symptoms are aggravated by bending, twisting and position. She has tried nothing for the symptoms.   Hypertension    Associated symptoms include neck pain.     Review of patient's allergies indicates:  No Known Allergies  Past Medical History:   Diagnosis Date    Diabetes mellitus     GERD (gastroesophageal reflux disease)     Hypertension      Past Surgical History:   Procedure Laterality Date    CHOLECYSTECTOMY      TUBAL LIGATION       History reviewed. No pertinent family history.  Social History     Tobacco Use    Smoking status: Never Smoker    Smokeless tobacco: Never Used   Substance Use Topics    Alcohol use: No    Drug use: No     Review of Systems   Constitutional: Negative.    HENT: Negative.    Eyes: Negative.    Respiratory: Negative.  Negative for chest tightness.    Gastrointestinal: Negative.    Endocrine: Negative.    Genitourinary: Negative.    Musculoskeletal: Positive for neck pain.   Skin: Negative.    Allergic/Immunologic: Negative.    Neurological: Negative.    Hematological: Negative.    Psychiatric/Behavioral: Negative.         Physical Exam     Initial Vitals [05/09/19 1549]   BP Pulse Resp Temp SpO2   (!) 212/105 71 16 98 °F (36.7 °C) 100 %      MAP       --         Physical Exam    Nursing note and vitals reviewed.  Constitutional: She appears well-developed and well-nourished.   HENT:   Head: Normocephalic.   Eyes: Conjunctivae are normal.   Neck: Normal range of motion. No stridor present. Muscular tenderness present. No Brudzinski's sign and no Kernig's sign noted.       Pain on palpation over lateral sternocleidomastoid muscle/trapezius junction.  No radiculopathy down arm   Cardiovascular: Normal rate.   Pulmonary/Chest: Breath sounds normal.   Abdominal: Soft.   Neurological: She is alert. She has normal strength. GCS score is 15. GCS eye subscore is 4. GCS verbal subscore is 5. GCS motor subscore is 6.   Skin: Skin is warm and dry. Capillary refill takes less than 2 seconds.   Psychiatric: She has a normal mood and affect.         ED Course   Procedures  Labs Reviewed   POCT GLUCOSE          Imaging Results    None          Medical Decision Making:   Initial Assessment:   Neck strain/pain with hypertensive urgency  ED Management:  Hypertension managed in the ED.  Neck pain relieved with oral analgesics recommend follow-up with her primary doctor for blood pressure management and workup of neck pain if still present                      Clinical Impression:       ICD-10-CM ICD-9-CM   1. Acute strain of neck muscle, initial encounter S16.1XXA 847.0   2. Hypertensive urgency I16.0 401.9         Disposition:   Disposition: Discharged  Condition: Stable                        EBER Knight III, MD  05/09/19 2500

## 2019-09-10 ENCOUNTER — HOSPITAL ENCOUNTER (EMERGENCY)
Facility: HOSPITAL | Age: 49
Discharge: HOME | End: 2019-09-10
Attending: EMERGENCY MEDICINE
Payer: MEDICAID

## 2019-09-10 VITALS
WEIGHT: 293 LBS | OXYGEN SATURATION: 98 % | HEIGHT: 63 IN | TEMPERATURE: 98 F | HEART RATE: 78 BPM | DIASTOLIC BLOOD PRESSURE: 98 MMHG | SYSTOLIC BLOOD PRESSURE: 160 MMHG | RESPIRATION RATE: 16 BRPM | BODY MASS INDEX: 51.91 KG/M2

## 2019-09-10 DIAGNOSIS — K57.30 DIVERTICULOSIS OF LARGE INTESTINE WITHOUT HEMORRHAGE: ICD-10-CM

## 2019-09-10 DIAGNOSIS — R10.32 ABDOMINAL PAIN, ACUTE, LEFT LOWER QUADRANT: Primary | ICD-10-CM

## 2019-09-10 LAB
ALBUMIN SERPL BCP-MCNC: 4 G/DL (ref 3.5–5.2)
ALP SERPL-CCNC: 95 U/L (ref 38–126)
ALT SERPL W/O P-5'-P-CCNC: 17 U/L (ref 10–44)
ANION GAP SERPL CALC-SCNC: 7 MMOL/L (ref 8–16)
AST SERPL-CCNC: 24 U/L (ref 15–46)
BASOPHILS # BLD AUTO: 0.1 K/UL (ref 0–0.2)
BASOPHILS NFR BLD: 1.2 % (ref 0–1.9)
BILIRUB SERPL-MCNC: 0.4 MG/DL (ref 0.1–1)
BILIRUB UR QL STRIP: NEGATIVE
BUN SERPL-MCNC: 14 MG/DL (ref 7–17)
CALCIUM SERPL-MCNC: 9 MG/DL (ref 8.7–10.5)
CHLORIDE SERPL-SCNC: 103 MMOL/L (ref 95–110)
CLARITY UR REFRACT.AUTO: CLEAR
CO2 SERPL-SCNC: 25 MMOL/L (ref 23–29)
COLOR UR AUTO: YELLOW
CREAT SERPL-MCNC: 0.71 MG/DL (ref 0.5–1.4)
DIFFERENTIAL METHOD: NORMAL
EOSINOPHIL # BLD AUTO: 0.1 K/UL (ref 0–0.5)
EOSINOPHIL NFR BLD: 0.9 % (ref 0–8)
ERYTHROCYTE [DISTWIDTH] IN BLOOD BY AUTOMATED COUNT: 13 % (ref 11.5–14.5)
EST. GFR  (AFRICAN AMERICAN): >60 ML/MIN/1.73 M^2
EST. GFR  (NON AFRICAN AMERICAN): >60 ML/MIN/1.73 M^2
GLUCOSE SERPL-MCNC: 105 MG/DL (ref 70–110)
GLUCOSE UR QL STRIP: NEGATIVE
HCT VFR BLD AUTO: 41.1 % (ref 37–48.5)
HGB BLD-MCNC: 13.3 G/DL (ref 12–16)
HGB UR QL STRIP: ABNORMAL
KETONES UR QL STRIP: NEGATIVE
LEUKOCYTE ESTERASE UR QL STRIP: NEGATIVE
LIPASE SERPL-CCNC: 186 U/L (ref 23–300)
LYMPHOCYTES # BLD AUTO: 2.9 K/UL (ref 1–4.8)
LYMPHOCYTES NFR BLD: 35.9 % (ref 18–48)
MCH RBC QN AUTO: 29.4 PG (ref 27–31)
MCHC RBC AUTO-ENTMCNC: 32.4 G/DL (ref 32–36)
MCV RBC AUTO: 91 FL (ref 82–98)
MICROSCOPIC COMMENT: NORMAL
MONOCYTES # BLD AUTO: 0.7 K/UL (ref 0.3–1)
MONOCYTES NFR BLD: 8.3 % (ref 4–15)
NEUTROPHILS # BLD AUTO: 4.3 K/UL (ref 1.8–7.7)
NEUTROPHILS NFR BLD: 53.7 % (ref 38–73)
NITRITE UR QL STRIP: NEGATIVE
PH UR STRIP: 5 [PH] (ref 5–8)
PLATELET # BLD AUTO: 333 K/UL (ref 150–350)
PMV BLD AUTO: 9.3 FL (ref 9.2–12.9)
POTASSIUM SERPL-SCNC: 3.8 MMOL/L (ref 3.5–5.1)
PROT SERPL-MCNC: 7.7 G/DL (ref 6–8.4)
PROT UR QL STRIP: NEGATIVE
RBC # BLD AUTO: 4.53 M/UL (ref 4–5.4)
RBC #/AREA URNS AUTO: 2 /HPF (ref 0–4)
SODIUM SERPL-SCNC: 135 MMOL/L (ref 136–145)
SP GR UR STRIP: 1.02 (ref 1–1.03)
URN SPEC COLLECT METH UR: ABNORMAL
UROBILINOGEN UR STRIP-ACNC: NEGATIVE EU/DL
WBC # BLD AUTO: 8.06 K/UL (ref 3.9–12.7)

## 2019-09-10 PROCEDURE — 83690 ASSAY OF LIPASE: CPT | Mod: ER

## 2019-09-10 PROCEDURE — 96374 THER/PROPH/DIAG INJ IV PUSH: CPT | Mod: ER

## 2019-09-10 PROCEDURE — 63600175 PHARM REV CODE 636 W HCPCS: Mod: ER | Performed by: EMERGENCY MEDICINE

## 2019-09-10 PROCEDURE — 85025 COMPLETE CBC W/AUTO DIFF WBC: CPT | Mod: ER

## 2019-09-10 PROCEDURE — 99284 EMERGENCY DEPT VISIT MOD MDM: CPT | Mod: 25,ER

## 2019-09-10 PROCEDURE — 96361 HYDRATE IV INFUSION ADD-ON: CPT | Mod: ER

## 2019-09-10 PROCEDURE — 80053 COMPREHEN METABOLIC PANEL: CPT | Mod: ER

## 2019-09-10 PROCEDURE — 81000 URINALYSIS NONAUTO W/SCOPE: CPT | Mod: ER

## 2019-09-10 RX ORDER — KETOROLAC TROMETHAMINE 30 MG/ML
30 INJECTION, SOLUTION INTRAMUSCULAR; INTRAVENOUS
Status: COMPLETED | OUTPATIENT
Start: 2019-09-10 | End: 2019-09-10

## 2019-09-10 RX ORDER — DICYCLOMINE HYDROCHLORIDE 20 MG/1
20 TABLET ORAL 3 TIMES DAILY PRN
Qty: 20 TABLET | Refills: 0 | Status: SHIPPED | OUTPATIENT
Start: 2019-09-10 | End: 2019-10-10

## 2019-09-10 RX ORDER — TRAMADOL HYDROCHLORIDE 50 MG/1
50 TABLET ORAL EVERY 6 HOURS PRN
Qty: 15 TABLET | Refills: 0 | Status: SHIPPED | OUTPATIENT
Start: 2019-09-10 | End: 2019-09-10 | Stop reason: SDUPTHER

## 2019-09-10 RX ORDER — DICYCLOMINE HYDROCHLORIDE 20 MG/1
20 TABLET ORAL 3 TIMES DAILY PRN
Qty: 20 TABLET | Refills: 0 | Status: SHIPPED | OUTPATIENT
Start: 2019-09-10 | End: 2019-09-10 | Stop reason: SDUPTHER

## 2019-09-10 RX ORDER — TRAMADOL HYDROCHLORIDE 50 MG/1
50 TABLET ORAL EVERY 6 HOURS PRN
Qty: 15 TABLET | Refills: 0 | Status: SHIPPED | OUTPATIENT
Start: 2019-09-10

## 2019-09-10 RX ADMIN — SODIUM CHLORIDE 1000 ML: 0.9 INJECTION, SOLUTION INTRAVENOUS at 08:09

## 2019-09-10 RX ADMIN — KETOROLAC TROMETHAMINE 30 MG: 30 INJECTION, SOLUTION INTRAMUSCULAR at 08:09

## 2019-09-10 NOTE — ED PROVIDER NOTES
"Encounter Date: 9/10/2019       History     Chief Complaint   Patient presents with    left lower abd pain     "I been having a sharp pain on the left side of my stomach at the bottom for 2 days now" denies N,V,D. LBM yesterday, denies fever     49-year-old female presents complaining of left lower quadrant abdominal pain. Pain is been going on for 2 days now.  Patient denies fever/chills/nausea/vomiting/diarrhea.  Patient has past surgical history significant for cholecystectomy and bilateral tubal ligation.  Patient has past medical history significant for diabetes mellitus and GERD.  Pain is rated 6/10.  Patient denies dysuria/hematuria.        Review of patient's allergies indicates:  No Known Allergies  Past Medical History:   Diagnosis Date    Diabetes mellitus     GERD (gastroesophageal reflux disease)     Hypertension      Past Surgical History:   Procedure Laterality Date    CHOLECYSTECTOMY      TUBAL LIGATION       No family history on file.  Social History     Tobacco Use    Smoking status: Never Smoker    Smokeless tobacco: Never Used   Substance Use Topics    Alcohol use: No    Drug use: No     Review of Systems   Gastrointestinal: Positive for abdominal pain.   All other systems reviewed and are negative.      Physical Exam     Initial Vitals [09/10/19 1758]   BP Pulse Resp Temp SpO2   (!) 172/96 72 17 98.1 °F (36.7 °C) 100 %      MAP       --         Physical Exam    Nursing note and vitals reviewed.  Constitutional: She appears well-developed and well-nourished. She is not diaphoretic. No distress.   HENT:   Head: Normocephalic and atraumatic.   Mouth/Throat: Oropharynx is clear and moist.   Eyes: Conjunctivae and EOM are normal. Pupils are equal, round, and reactive to light.   Neck: Normal range of motion. Neck supple. No JVD present.   Cardiovascular: Normal rate, regular rhythm, normal heart sounds and intact distal pulses. Exam reveals no gallop and no friction rub.    No murmur " heard.  Pulmonary/Chest: Breath sounds normal. No respiratory distress. She has no wheezes. She has no rhonchi. She has no rales.   Abdominal: Soft. Bowel sounds are normal. She exhibits no distension and no mass. There is tenderness. There is no rebound and no guarding.   Positive left lower quadrant tenderness. No guarding no rebound.   Musculoskeletal: Normal range of motion. She exhibits no edema or tenderness.   Lymphadenopathy:     She has no cervical adenopathy.   Neurological: She is alert and oriented to person, place, and time. GCS score is 15. GCS eye subscore is 4. GCS verbal subscore is 5. GCS motor subscore is 6.   Skin: Skin is warm. Capillary refill takes less than 2 seconds. No rash noted. No erythema.         ED Course   Procedures  Labs Reviewed   URINALYSIS, REFLEX TO URINE CULTURE - Abnormal; Notable for the following components:       Result Value    Occult Blood UA 2+ (*)     All other components within normal limits    Narrative:     Preferred Collection Type->Urine, Clean Catch   COMPREHENSIVE METABOLIC PANEL - Abnormal; Notable for the following components:    Sodium 135 (*)     Anion Gap 7 (*)     All other components within normal limits   URINALYSIS MICROSCOPIC    Narrative:     Preferred Collection Type->Urine, Clean Catch   CBC W/ AUTO DIFFERENTIAL   LIPASE         Results for orders placed or performed during the hospital encounter of 09/10/19   Urinalysis, Reflex to Urine Culture Urine, Clean Catch   Result Value Ref Range    Specimen UA Urine, Clean Catch     Color, UA Yellow Yellow, Straw, Samira    Appearance, UA Clear Clear    pH, UA 5.0 5.0 - 8.0    Specific Gravity, UA 1.020 1.005 - 1.030    Protein, UA Negative Negative    Glucose, UA Negative Negative    Ketones, UA Negative Negative    Bilirubin (UA) Negative Negative    Occult Blood UA 2+ (A) Negative    Nitrite, UA Negative Negative    Urobilinogen, UA Negative <2.0 EU/dL    Leukocytes, UA Negative Negative   Urinalysis  Microscopic   Result Value Ref Range    RBC, UA 2 0 - 4 /hpf    Microscopic Comment SEE COMMENT    CBC W/ AUTO DIFFERENTIAL   Result Value Ref Range    WBC 8.06 3.90 - 12.70 K/uL    RBC 4.53 4.00 - 5.40 M/uL    Hemoglobin 13.3 12.0 - 16.0 g/dL    Hematocrit 41.1 37.0 - 48.5 %    Mean Corpuscular Volume 91 82 - 98 fL    Mean Corpuscular Hemoglobin 29.4 27.0 - 31.0 pg    Mean Corpuscular Hemoglobin Conc 32.4 32.0 - 36.0 g/dL    RDW 13.0 11.5 - 14.5 %    Platelets 333 150 - 350 K/uL    MPV 9.3 9.2 - 12.9 fL    Gran # (ANC) 4.3 1.8 - 7.7 K/uL    Lymph # 2.9 1.0 - 4.8 K/uL    Mono # 0.7 0.3 - 1.0 K/uL    Eos # 0.1 0.0 - 0.5 K/uL    Baso # 0.10 0.00 - 0.20 K/uL    Gran% 53.7 38.0 - 73.0 %    Lymph% 35.9 18.0 - 48.0 %    Mono% 8.3 4.0 - 15.0 %    Eosinophil% 0.9 0.0 - 8.0 %    Basophil% 1.2 0.0 - 1.9 %    Differential Method Automated    Comp. Metabolic Panel   Result Value Ref Range    Sodium 135 (L) 136 - 145 mmol/L    Potassium 3.8 3.5 - 5.1 mmol/L    Chloride 103 95 - 110 mmol/L    CO2 25 23 - 29 mmol/L    Glucose 105 70 - 110 mg/dL    BUN, Bld 14 7 - 17 mg/dL    Creatinine 0.71 0.50 - 1.40 mg/dL    Calcium 9.0 8.7 - 10.5 mg/dL    Total Protein 7.7 6.0 - 8.4 g/dL    Albumin 4.0 3.5 - 5.2 g/dL    Total Bilirubin 0.4 0.1 - 1.0 mg/dL    Alkaline Phosphatase 95 38 - 126 U/L    AST 24 15 - 46 U/L    ALT 17 10 - 44 U/L    Anion Gap 7 (L) 8 - 16 mmol/L    eGFR if African American >60.0 >60 mL/min/1.73 m^2    eGFR if non African American >60.0 >60 mL/min/1.73 m^2   Lipase   Result Value Ref Range    Lipase Result 186 23 - 300 U/L         Imaging Results          CT Abdomen Pelvis  Without Contrast (Final result)  Result time 09/10/19 20:58:59    Final result by Eliazar Justin MD (09/10/19 20:58:59)                 Impression:      Colonic diverticulosis.  Patient is status post cholecystectomy.  No acute findings.    All CT scans at this facility are performed  using dose modulation techniques as appropriate to performed exam  including the following:  automated exposure control; adjustment of mA and/or kV according to the patients size (this includes techniques or standardized protocols for targeted exams where dose is matched to indication/reason for exam: i.e. extremities or head);  iterative reconstruction technique.      Electronically signed by: Eliazar Justin  Date:    09/10/2019  Time:    20:58             Narrative:    EXAMINATION:  CT ABDOMEN PELVIS WITHOUT CONTRAST    CLINICAL HISTORY:  Flank pain, stone disease suspected;    COMPARISON:  09/26/2016    FINDINGS:  The lung bases are clear.    The liver and spleen are normal.    The gallbladder has been removed.  The pancreas is unremarkable.    2.3 cm adenoma in the left adrenal gland.    No kidney stones hydronephrosis or renal masses are appreciated.    The vascular structures are unremarkable.    Urinary bladder appears normal.  There is an IUD in place.    No bowel dilatation or acute bowel abnormality is identified.  The appendix appears normal.  Scattered colonic diverticula without CT evidence of diverticulitis.    No significant osseous findings.                                                      Clinical Impression:       ICD-10-CM ICD-9-CM   1. Abdominal pain, acute, left lower quadrant R10.32 789.04     338.19   2. Diverticulosis of large intestine without hemorrhage K57.30 562.10                                Freeman Medrano MD  09/12/19 0717       Freeman Medrano MD  09/12/19 0718

## 2021-03-26 ENCOUNTER — HOSPITAL ENCOUNTER (OUTPATIENT)
Dept: RADIOLOGY | Facility: HOSPITAL | Age: 51
Discharge: HOME | End: 2021-03-26
Attending: NURSE PRACTITIONER
Payer: MEDICAID
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
## 2021-03-26 DIAGNOSIS — S39.92XD INJURY TO BACK, SUBSEQUENT ENCOUNTER: ICD-10-CM

## 2021-03-26 PROCEDURE — 72100 X-RAY EXAM L-S SPINE 2/3 VWS: CPT | Mod: TC,FY,PO

## 2021-04-22 ENCOUNTER — HOSPITAL ENCOUNTER (EMERGENCY)
Facility: HOSPITAL | Age: 51
Discharge: HOME | End: 2021-04-22
Attending: EMERGENCY MEDICINE
Payer: MEDICAID

## 2021-04-22 VITALS
RESPIRATION RATE: 18 BRPM | HEART RATE: 72 BPM | TEMPERATURE: 98 F | DIASTOLIC BLOOD PRESSURE: 69 MMHG | BODY MASS INDEX: 51.91 KG/M2 | OXYGEN SATURATION: 100 % | SYSTOLIC BLOOD PRESSURE: 151 MMHG | HEIGHT: 63 IN | WEIGHT: 293 LBS

## 2021-04-22 DIAGNOSIS — M54.42 ACUTE BILATERAL LOW BACK PAIN WITH BILATERAL SCIATICA: Primary | ICD-10-CM

## 2021-04-22 DIAGNOSIS — M54.41 ACUTE BILATERAL LOW BACK PAIN WITH BILATERAL SCIATICA: Primary | ICD-10-CM

## 2021-04-22 LAB — POCT GLUCOSE: 98 MG/DL (ref 70–110)

## 2021-04-22 PROCEDURE — 82962 GLUCOSE BLOOD TEST: CPT | Mod: ER

## 2021-04-22 PROCEDURE — 96372 THER/PROPH/DIAG INJ SC/IM: CPT | Mod: ER

## 2021-04-22 PROCEDURE — 99284 EMERGENCY DEPT VISIT MOD MDM: CPT | Mod: 25,ER

## 2021-04-22 PROCEDURE — 63600175 PHARM REV CODE 636 W HCPCS: Mod: ER | Performed by: EMERGENCY MEDICINE

## 2021-04-22 RX ORDER — DEXAMETHASONE SODIUM PHOSPHATE 4 MG/ML
8 INJECTION, SOLUTION INTRA-ARTICULAR; INTRALESIONAL; INTRAMUSCULAR; INTRAVENOUS; SOFT TISSUE
Status: COMPLETED | OUTPATIENT
Start: 2021-04-22 | End: 2021-04-22

## 2021-04-22 RX ORDER — ATORVASTATIN CALCIUM 10 MG/1
10 TABLET, FILM COATED ORAL DAILY
COMMUNITY

## 2021-04-22 RX ORDER — PROCHLORPERAZINE EDISYLATE 5 MG/ML
10 INJECTION INTRAMUSCULAR; INTRAVENOUS
Status: DISCONTINUED | OUTPATIENT
Start: 2021-04-22 | End: 2021-04-22

## 2021-04-22 RX ORDER — CYCLOBENZAPRINE HCL 10 MG
10 TABLET ORAL EVERY 8 HOURS PRN
Qty: 14 TABLET | Refills: 0 | Status: SHIPPED | OUTPATIENT
Start: 2021-04-22 | End: 2021-04-27

## 2021-04-22 RX ORDER — IBUPROFEN 800 MG/1
800 TABLET ORAL 3 TIMES DAILY
COMMUNITY

## 2021-04-22 RX ADMIN — DEXAMETHASONE SODIUM PHOSPHATE 8 MG: 4 INJECTION, SOLUTION INTRAMUSCULAR; INTRAVENOUS at 05:04

## 2021-07-25 ENCOUNTER — HOSPITAL ENCOUNTER (EMERGENCY)
Facility: HOSPITAL | Age: 51
Discharge: HOME | End: 2021-07-25
Attending: EMERGENCY MEDICINE
Payer: MEDICAID

## 2021-07-25 VITALS
OXYGEN SATURATION: 98 % | WEIGHT: 293 LBS | DIASTOLIC BLOOD PRESSURE: 72 MMHG | BODY MASS INDEX: 53.14 KG/M2 | HEART RATE: 93 BPM | TEMPERATURE: 99 F | SYSTOLIC BLOOD PRESSURE: 138 MMHG | RESPIRATION RATE: 20 BRPM

## 2021-07-25 DIAGNOSIS — U07.1 COVID-19 VIRUS DETECTED: Primary | ICD-10-CM

## 2021-07-25 LAB — SARS-COV-2 RDRP RESP QL NAA+PROBE: POSITIVE

## 2021-07-25 PROCEDURE — 99283 EMERGENCY DEPT VISIT LOW MDM: CPT | Mod: ER

## 2021-07-25 PROCEDURE — U0002 COVID-19 LAB TEST NON-CDC: HCPCS | Mod: ER | Performed by: EMERGENCY MEDICINE

## 2021-07-25 RX ORDER — BENZONATATE 100 MG/1
100 CAPSULE ORAL 3 TIMES DAILY PRN
Qty: 20 CAPSULE | Refills: 0 | Status: SHIPPED | OUTPATIENT
Start: 2021-07-25 | End: 2021-08-04

## 2021-07-28 ENCOUNTER — NURSE TRIAGE (OUTPATIENT)
Dept: ADMINISTRATIVE | Facility: CLINIC | Age: 51
End: 2021-07-28

## 2021-07-29 ENCOUNTER — INFUSION (OUTPATIENT)
Dept: INFECTIOUS DISEASES | Facility: HOSPITAL | Age: 51
End: 2021-07-29
Payer: MEDICAID

## 2021-07-29 VITALS
BODY MASS INDEX: 50.02 KG/M2 | DIASTOLIC BLOOD PRESSURE: 89 MMHG | TEMPERATURE: 99 F | RESPIRATION RATE: 18 BRPM | SYSTOLIC BLOOD PRESSURE: 135 MMHG | WEIGHT: 293 LBS | HEIGHT: 64 IN | OXYGEN SATURATION: 95 % | HEART RATE: 75 BPM

## 2021-07-29 DIAGNOSIS — U07.1 COVID-19 VIRUS DETECTED: Primary | ICD-10-CM

## 2021-07-29 PROCEDURE — 63600175 PHARM REV CODE 636 W HCPCS: Performed by: INTERNAL MEDICINE

## 2021-07-29 PROCEDURE — M0243 CASIRIVI AND IMDEVI INFUSION: HCPCS | Performed by: INTERNAL MEDICINE

## 2021-07-29 PROCEDURE — 25000003 PHARM REV CODE 250: Performed by: INTERNAL MEDICINE

## 2021-07-29 RX ORDER — EPINEPHRINE 0.3 MG/.3ML
0.3 INJECTION SUBCUTANEOUS
Status: ACTIVE | OUTPATIENT
Start: 2021-07-29

## 2021-07-29 RX ORDER — SODIUM CHLORIDE 0.9 % (FLUSH) 0.9 %
10 SYRINGE (ML) INJECTION
Status: ACTIVE | OUTPATIENT
Start: 2021-07-29

## 2021-07-29 RX ORDER — ONDANSETRON 4 MG/1
4 TABLET, ORALLY DISINTEGRATING ORAL ONCE AS NEEDED
Status: DISCONTINUED | OUTPATIENT
Start: 2021-07-29 | End: 2024-01-17

## 2021-07-29 RX ORDER — ALBUTEROL SULFATE 90 UG/1
2 AEROSOL, METERED RESPIRATORY (INHALATION)
Status: ACTIVE | OUTPATIENT
Start: 2021-07-29

## 2021-07-29 RX ORDER — ACETAMINOPHEN 325 MG/1
650 TABLET ORAL ONCE AS NEEDED
Status: ACTIVE | OUTPATIENT
Start: 2021-07-29 | End: 2032-12-25

## 2021-07-29 RX ORDER — DIPHENHYDRAMINE HYDROCHLORIDE 50 MG/ML
25 INJECTION INTRAMUSCULAR; INTRAVENOUS ONCE AS NEEDED
Status: ACTIVE | OUTPATIENT
Start: 2021-07-29 | End: 2032-12-25

## 2021-07-29 RX ADMIN — CASIRIVIMAB AND IMDEVIMAB 600 MG: 600; 600 INJECTION, SOLUTION, CONCENTRATE INTRAVENOUS at 01:07

## 2022-03-01 ENCOUNTER — HOSPITAL ENCOUNTER (EMERGENCY)
Facility: HOSPITAL | Age: 52
Discharge: HOME | End: 2022-03-01
Attending: EMERGENCY MEDICINE
Payer: MEDICAID

## 2022-03-01 VITALS
RESPIRATION RATE: 16 BRPM | DIASTOLIC BLOOD PRESSURE: 82 MMHG | TEMPERATURE: 98 F | HEART RATE: 86 BPM | SYSTOLIC BLOOD PRESSURE: 141 MMHG | BODY MASS INDEX: 50.02 KG/M2 | WEIGHT: 293 LBS | OXYGEN SATURATION: 99 % | HEIGHT: 64 IN

## 2022-03-01 DIAGNOSIS — M89.8X1 CLAVICLE PAIN: ICD-10-CM
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
## 2022-03-01 PROCEDURE — 99284 EMERGENCY DEPT VISIT MOD MDM: CPT | Mod: 25,ER

## 2022-03-01 PROCEDURE — 25000003 PHARM REV CODE 250: Mod: ER | Performed by: PHYSICIAN ASSISTANT

## 2022-03-01 RX ORDER — METHOCARBAMOL 750 MG/1
1500 TABLET, FILM COATED ORAL 3 TIMES DAILY
Qty: 30 TABLET | Refills: 0 | Status: SHIPPED | OUTPATIENT
Start: 2022-03-01 | End: 2022-03-06

## 2022-03-01 RX ORDER — NAPROXEN 500 MG/1
500 TABLET ORAL 2 TIMES DAILY WITH MEALS
Qty: 14 TABLET | Refills: 0 | Status: SHIPPED | OUTPATIENT
Start: 2022-03-01

## 2022-03-01 RX ORDER — IBUPROFEN 600 MG/1
600 TABLET ORAL
Status: COMPLETED | OUTPATIENT
Start: 2022-03-01 | End: 2022-03-01

## 2022-03-01 RX ADMIN — IBUPROFEN 600 MG: 600 TABLET ORAL at 06:03
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
## 2022-05-11 ENCOUNTER — HOSPITAL ENCOUNTER (EMERGENCY)
Facility: HOSPITAL | Age: 52
Discharge: HOME | End: 2022-05-12
Attending: EMERGENCY MEDICINE
Payer: MEDICAID

## 2022-05-11 DIAGNOSIS — Z76.0 MEDICATION REFILL: Primary | ICD-10-CM

## 2022-05-11 DIAGNOSIS — I10 HYPERTENSION, UNSPECIFIED TYPE: ICD-10-CM

## 2022-05-11 PROCEDURE — 99284 EMERGENCY DEPT VISIT MOD MDM: CPT | Mod: ER

## 2022-05-11 RX ORDER — AMLODIPINE BESYLATE 5 MG/1
10 TABLET ORAL
Status: COMPLETED | OUTPATIENT
Start: 2022-05-12 | End: 2022-05-12

## 2022-05-11 RX ORDER — HYDROCHLOROTHIAZIDE 25 MG/1
25 TABLET ORAL DAILY
Qty: 30 TABLET | Refills: 1 | Status: SHIPPED | OUTPATIENT
Start: 2022-05-11 | End: 2022-05-12 | Stop reason: SDUPTHER

## 2022-05-11 RX ORDER — AMLODIPINE BESYLATE 10 MG/1
10 TABLET ORAL DAILY
Qty: 30 TABLET | Refills: 1 | Status: SHIPPED | OUTPATIENT
Start: 2022-05-11 | End: 2022-05-12 | Stop reason: SDUPTHER

## 2022-05-11 NOTE — Clinical Note
"Hernando"Hernando" Brittney was seen and treated in our emergency department on 5/11/2022.  She may return to work on 05/14/2022.       If you have any questions or concerns, please don't hesitate to call.      FERCHO Rodriguez RN    "

## 2022-05-12 VITALS
SYSTOLIC BLOOD PRESSURE: 197 MMHG | HEIGHT: 64 IN | WEIGHT: 293 LBS | OXYGEN SATURATION: 97 % | BODY MASS INDEX: 50.02 KG/M2 | HEART RATE: 80 BPM | DIASTOLIC BLOOD PRESSURE: 79 MMHG | TEMPERATURE: 98 F | RESPIRATION RATE: 18 BRPM

## 2022-05-12 PROCEDURE — 25000003 PHARM REV CODE 250: Mod: ER | Performed by: EMERGENCY MEDICINE

## 2022-05-12 RX ORDER — HYDROCHLOROTHIAZIDE 25 MG/1
25 TABLET ORAL DAILY
Qty: 30 TABLET | Refills: 1 | Status: SHIPPED | OUTPATIENT
Start: 2022-05-12

## 2022-05-12 RX ORDER — AMLODIPINE BESYLATE 10 MG/1
10 TABLET ORAL DAILY
Qty: 30 TABLET | Refills: 1 | Status: SHIPPED | OUTPATIENT
Start: 2022-05-12

## 2022-05-12 RX ADMIN — AMLODIPINE BESYLATE 10 MG: 5 TABLET ORAL at 12:05

## 2022-05-12 NOTE — ED TRIAGE NOTES
Reports to ED c c/o leg pain and swelling x 1 week. Reports being out of HCTZ and amlodipine x 2 weeks. Also reports HA

## 2022-05-12 NOTE — ED PROVIDER NOTES
"  Chief Complaint:  Bilateral leg swelling, out of medications  History of Present Illness:    Hernando Lugo 51 y.o. with a  has a past medical history of Diabetes mellitus, GERD (gastroesophageal reflux disease), and Hypertension. who presents to the emergency department today with a complaint of bilateral leg swelling and being out of her medications.  Patient reports she is out of her HCTZ and her high blood pressure medications.  She has been out for 2 weeks.  She has been noticing some increased swelling in her legs.  She denies any shortness of breath, chest pain, palpitations, cough, hemoptysis, unilateral leg swelling.  No headaches.  No change in urination.  No vision change.    ROS    Constitutional: No fever, no chills.  Eyes: No discharge. No pain.  HENT: No nasal drainage. No ear ache. No sore throat.  Cardiovascular: No chest pain, no palpitations.  Respiratory: No cough, no shortness of breath.  Gastrointestinal: No abdominal pain, no vomiting. No diarrhea.  Genitourinary: No hematuria, dysuria, urgency.  Musculoskeletal: No back pain.   Skin: No rashes, no lesions.  Neurological: No headache, no focal weakness.    Otherwise remaining ROS negative     The history is provided by the patient      ALLERGIES REVIEWED  MEDICATIONS REVIEWED  PMH/PSH/SOC/FH REVIEWED :  Hernando Lugo  has a past medical history of Diabetes mellitus, GERD (gastroesophageal reflux disease), and Hypertension. and   has a past surgical history that includes Cholecystectomy and Tubal ligation. with  reports that she has never smoked. She has never used smokeless tobacco. She reports that she does not drink alcohol and does not use drugs. and a family history is not on file.      Nursing/Ancillary staff note reviewed.  VS reviewed         Physical Exam     BP (!) 197/79 (BP Location: Left arm, Patient Position: Sitting)   Pulse 80   Temp 98.4 °F (36.9 °C)   Resp 18   Ht 5' 4" (1.626 m)   Wt (!) 140.6 kg (310 lb)   LMP " 04/30/2022   SpO2 97%   Breastfeeding No   BMI 53.21 kg/m²     Physical Exam    General Appearance: The patient is alert, has no immediate need for airway protection and no signs of toxicity. No acute distress. Lying in bed but able to sit up without difficulty.   HEENT: Eyes: Pupils equal and round no pallor or injection. Extra ocular movements intact. No drainage.       Mouth: Mucous membranes are moist. Oropharynx clear.   Neck:Neck is supple non-tender. No lymphadenopathy. No stridor.   Respiratory: There are no retractions, lungs are clear to auscultation. No wheezing, no crackles. Chest wall nontender to palpation.   Cardiovascular: Regular rate and rhythm. No murmurs, rubs or gallops.  Gastrointestinal:  Abdomen is soft and non-tender, no masses, bowel sounds normal. No guarding, no rebound.  No pulsatile mass.   Neurological: Alert and oriented x 4. CN II-XII grossly intact. No focal weakness. Strength intact 5/5 bilaterally in upper and lower extremities.   Skin: Warm and dry, no rashes.  Musculoskeletal: Extremities are non-tender, nonpitting edema bilaterally and have full range of motion. Back NTTP along the midline.     DIFFERENTIAL DIAGNOSIS: After history and physical exam a differential diagnosis was considered, but was not limited to, heart failure, kidney failure, dependent edema, liver failure, DVT, protein deficiency,                     ED Course     Medications   amLODIPine tablet 10 mg (10 mg Oral Given 5/12/22 0002)                     Medical Decision Making:   History:   I obtained history from:  The patient  Old Medical Records: I decided to obtain old medical records.         ED Management:  Hernando Lugo  presents to the emergency Department today with for medication refill.  She feels as though her legs are swollen.  She has no pitting edema.  No shortness of breath.  No coarse breath sounds.  She does have an elevated blood pressure but has not been taking her medications.  She has  a symptomatic on her blood pressure from.  I will refill her medications, she will follow-up with her PCP.  No need for any further interventions at this time.  The pt is comfortable with this plan and comfortable going home at this time. After taking into careful account the historical factors and physical exam findings of the patient's presentation today, in conjunction with the empirical and objective data obtained on ED workup, no acute emergent medical condition requiring admission has been identified. The patient appears to be low risk for an emergent medical condition and I feel it is safe and appropriate at this time for the patient to be discharged to follow-up as detailed in their discharge instructions for reevaluation and possible continued outpatient workup and management. Regardless, an unremarkable evaluation in the ED does not preclude the development or presence of a serious or life threatening condition. As such, patient was instructed to return immediately for any worsening or change in current symptoms. Precautions for return discussed at length.  Discharge and follow-up instructions discussed with the patient who expressed understanding and willingness to comply with my recommendations.    Voice recognition software utilized in this note.              Impression      The primary encounter diagnosis was Medication refill. A diagnosis of Hypertension, unspecified type was also pertinent to this visit.                 Follow-up Information     Schedule an appointment as soon as possible for a visit  with Ryder Rodríguez MD.    Specialty: Family Medicine  Contact information:  [address] [place] [id number] [phone]                                      Clemente Albarran MD  05/12/22 4713

## 2022-05-12 NOTE — DISCHARGE INSTRUCTIONS
,Additional instructions  Followup with your primary care physician. Take all your medications as prescribed. Return to the emergency department if you have increasing pain, chest pain, difficulty breathing,  nonstop vomiting, or any other concerns. Be sure to drink plenty of fluids to stay hydrated. Get plenty of rest. Please refer to additional educational material for further instructions.

## 2022-05-15 ENCOUNTER — HOSPITAL ENCOUNTER (EMERGENCY)
Facility: HOSPITAL | Age: 52
Discharge: HOME | End: 2022-05-15
Attending: EMERGENCY MEDICINE
Payer: MEDICAID

## 2022-05-15 VITALS
SYSTOLIC BLOOD PRESSURE: 171 MMHG | BODY MASS INDEX: 51.91 KG/M2 | OXYGEN SATURATION: 95 % | RESPIRATION RATE: 19 BRPM | HEART RATE: 61 BPM | TEMPERATURE: 99 F | HEIGHT: 63 IN | DIASTOLIC BLOOD PRESSURE: 85 MMHG | WEIGHT: 293 LBS

## 2022-05-15 DIAGNOSIS — R10.9 ABDOMINAL PAIN, UNSPECIFIED ABDOMINAL LOCATION: Primary | ICD-10-CM

## 2022-05-15 LAB
ALBUMIN SERPL BCP-MCNC: 4.1 G/DL (ref 3.5–5.2)
ALP SERPL-CCNC: 93 U/L (ref 38–126)
ALT SERPL W/O P-5'-P-CCNC: 15 U/L (ref 10–44)
ANION GAP SERPL CALC-SCNC: 10 MMOL/L (ref 8–16)
AST SERPL-CCNC: 18 U/L (ref 15–46)
B-HCG UR QL: NEGATIVE
BACTERIA #/AREA URNS AUTO: ABNORMAL /HPF
BASOPHILS # BLD AUTO: 0.06 K/UL (ref 0–0.2)
BASOPHILS NFR BLD: 0.9 % (ref 0–1.9)
BILIRUB SERPL-MCNC: 0.6 MG/DL (ref 0.1–1)
BILIRUB UR QL STRIP: NEGATIVE
CALCIUM SERPL-MCNC: 9 MG/DL (ref 8.7–10.5)
CHLORIDE SERPL-SCNC: 101 MMOL/L (ref 95–110)
CLARITY UR REFRACT.AUTO: ABNORMAL
CO2 SERPL-SCNC: 29 MMOL/L (ref 23–29)
COLOR UR AUTO: YELLOW
CREAT SERPL-MCNC: 0.73 MG/DL (ref 0.5–1.4)
DIFFERENTIAL METHOD: NORMAL
EOSINOPHIL # BLD AUTO: 0 K/UL (ref 0–0.5)
EOSINOPHIL NFR BLD: 0.5 % (ref 0–8)
ERYTHROCYTE [DISTWIDTH] IN BLOOD BY AUTOMATED COUNT: 12.2 % (ref 11.5–14.5)
EST. GFR  (AFRICAN AMERICAN): >60 ML/MIN/1.73 M^2
EST. GFR  (NON AFRICAN AMERICAN): >60 ML/MIN/1.73 M^2
GLUCOSE SERPL-MCNC: 110 MG/DL (ref 70–110)
GLUCOSE UR QL STRIP: NEGATIVE
HCT VFR BLD AUTO: 42.4 % (ref 37–48.5)
HGB BLD-MCNC: 13.7 G/DL (ref 12–16)
HGB UR QL STRIP: ABNORMAL
HYALINE CASTS UR QL AUTO: 0 /LPF
IMM GRANULOCYTES # BLD AUTO: 0.01 K/UL (ref 0–0.04)
IMM GRANULOCYTES NFR BLD AUTO: 0.2 % (ref 0–0.5)
KETONES UR QL STRIP: NEGATIVE
LEUKOCYTE ESTERASE UR QL STRIP: ABNORMAL
LIPASE SERPL-CCNC: 100 U/L (ref 23–300)
LYMPHOCYTES # BLD AUTO: 2.4 K/UL (ref 1–4.8)
LYMPHOCYTES NFR BLD: 37 % (ref 18–48)
MCH RBC QN AUTO: 29.6 PG (ref 27–31)
MCHC RBC AUTO-ENTMCNC: 32.3 G/DL (ref 32–36)
MCV RBC AUTO: 92 FL (ref 82–98)
MICROSCOPIC COMMENT: ABNORMAL
MONOCYTES # BLD AUTO: 0.8 K/UL (ref 0.3–1)
MONOCYTES NFR BLD: 11.9 % (ref 4–15)
NEUTROPHILS # BLD AUTO: 3.2 K/UL (ref 1.8–7.7)
NEUTROPHILS NFR BLD: 49.5 % (ref 38–73)
NITRITE UR QL STRIP: NEGATIVE
NRBC BLD-RTO: 0 /100 WBC
PH UR STRIP: 5 [PH] (ref 5–8)
PLATELET # BLD AUTO: 379 K/UL (ref 150–450)
PMV BLD AUTO: 9.3 FL (ref 9.2–12.9)
POTASSIUM SERPL-SCNC: 3.5 MMOL/L (ref 3.5–5.1)
PROT SERPL-MCNC: 7.8 G/DL (ref 6–8.4)
PROT UR QL STRIP: ABNORMAL
RBC # BLD AUTO: 4.63 M/UL (ref 4–5.4)
RBC #/AREA URNS AUTO: 5 /HPF (ref 0–4)
SODIUM SERPL-SCNC: 140 MMOL/L (ref 136–145)
SP GR UR STRIP: 1.02 (ref 1–1.03)
URN SPEC COLLECT METH UR: ABNORMAL
UROBILINOGEN UR STRIP-ACNC: 1 EU/DL
UUN UR-MCNC: 15 MG/DL (ref 7–17)
WBC # BLD AUTO: 6.49 K/UL (ref 3.9–12.7)
WBC #/AREA URNS AUTO: 1 /HPF (ref 0–5)

## 2022-05-15 PROCEDURE — 85025 COMPLETE CBC W/AUTO DIFF WBC: CPT | Mod: ER | Performed by: EMERGENCY MEDICINE

## 2022-05-15 PROCEDURE — 25000003 PHARM REV CODE 250: Mod: ER | Performed by: EMERGENCY MEDICINE

## 2022-05-15 PROCEDURE — 81000 URINALYSIS NONAUTO W/SCOPE: CPT | Mod: ER | Performed by: EMERGENCY MEDICINE

## 2022-05-15 PROCEDURE — 83690 ASSAY OF LIPASE: CPT | Mod: ER | Performed by: EMERGENCY MEDICINE

## 2022-05-15 PROCEDURE — 80053 COMPREHEN METABOLIC PANEL: CPT | Mod: ER | Performed by: EMERGENCY MEDICINE

## 2022-05-15 PROCEDURE — 99284 EMERGENCY DEPT VISIT MOD MDM: CPT | Mod: ER

## 2022-05-15 PROCEDURE — 81025 URINE PREGNANCY TEST: CPT | Mod: ER | Performed by: EMERGENCY MEDICINE

## 2022-05-15 RX ORDER — LIDOCAINE HYDROCHLORIDE 20 MG/ML
10 SOLUTION OROPHARYNGEAL ONCE
Status: COMPLETED | OUTPATIENT
Start: 2022-05-15 | End: 2022-05-15

## 2022-05-15 RX ORDER — OMEPRAZOLE 20 MG/1
20 CAPSULE, DELAYED RELEASE ORAL DAILY
Qty: 30 CAPSULE | Refills: 0 | Status: SHIPPED | OUTPATIENT
Start: 2022-05-15 | End: 2023-05-15

## 2022-05-15 RX ORDER — MAG HYDROX/ALUMINUM HYD/SIMETH 200-200-20
30 SUSPENSION, ORAL (FINAL DOSE FORM) ORAL ONCE
Status: COMPLETED | OUTPATIENT
Start: 2022-05-15 | End: 2022-05-15

## 2022-05-15 RX ORDER — SULFAMETHOXAZOLE AND TRIMETHOPRIM 800; 160 MG/1; MG/1
1 TABLET ORAL 2 TIMES DAILY
Qty: 14 TABLET | Refills: 0 | Status: SHIPPED | OUTPATIENT
Start: 2022-05-15 | End: 2022-05-22

## 2022-05-15 RX ADMIN — ALUMINUM HYDROXIDE, MAGNESIUM HYDROXIDE, AND SIMETHICONE 30 ML: 200; 200; 20 SUSPENSION ORAL at 12:05

## 2022-05-15 RX ADMIN — LIDOCAINE HYDROCHLORIDE 10 ML: 20 SOLUTION ORAL; TOPICAL at 12:05

## 2022-05-15 NOTE — ED PROVIDER NOTES
Encounter Date: 5/15/2022       History     Chief Complaint   Patient presents with    Abdominal Pain     Pt reports epigastric pain since yesterday. States had BBQ at 1900 and chinese food yesterday morning. Pepto this morning without relief.      HPI     Ms. Lugo is a 51-year-old female with a history of hypertension diabetes who presents with 2-3 days of mild epigastric discomfort with some nausea but no vomiting no fever chills cough chest pain shortness of breath no other abdominal pain or flank pain no change in bowel habits no urinary complaints.  No melena hematochezia hematemesis hemoptysis.     Review of patient's allergies indicates:  No Known Allergies  Past Medical History:   Diagnosis Date    Diabetes mellitus     GERD (gastroesophageal reflux disease)     Hypertension      Past Surgical History:   Procedure Laterality Date    CHOLECYSTECTOMY      TUBAL LIGATION       No family history on file.  Social History     Tobacco Use    Smoking status: Never Smoker    Smokeless tobacco: Never Used   Substance Use Topics    Alcohol use: No    Drug use: No     Review of Systems     Constitutional: Negative for fever, chills, diaphoresis, activity change and fatigue.  HENT: Negative for nosebleeds, congestion, sore throat, facial swelling, sneezing, drooling, mouth sores, trouble swallowing, neck pain, neck stiffness, voice change and sinus pressure.   Eyes: Negative for pain and redness.  Respiratory: Negative for cough, choking, chest tightness, shortness of breath, wheezing and stridor.   Cardiovascular: Negative for chest pain, palpitations and leg swelling.  Gastrointestinal: Negative for vomiting, diarrhea, constipation, blood in stool and abdominal distention.  Genitourinary: Negative for dysuria, hematuria and flank pain.  Musculoskeletal: Negative for myalgias, joint swelling, arthralgias and gait problem.  Skin: Negative for pallor and rash.  Neurological: Negative for dizziness, numbness  and headaches. No loss of function.  Hematological: Negative for adenopathy.    Physical Exam     Initial Vitals [05/15/22 1122]   BP Pulse Resp Temp SpO2   (!) 183/101 77 19 99.1 °F (37.3 °C) 97 %      MAP       --         Physical Exam     Nursing note and vitals reviewed.    Constitutional: Awake alert and comfortable in appearance. Appears well-developed and well-nourished. No distress. Non-toxic and well appearing.  Head: Normocephalic and atraumatic.  Nose: Nose normal.  Mouth/Throat: Oropharynx is clear and moist. No oropharyngeal exudate.  Eyes: Conjunctivae normal and EOM are normal. Pupils are equal, round, and reactive to light. No scleral icterus.  Neck: Normal range of motion. Neck supple. No JVD present.  No point tenderness or bony deformity.  Cardiovascular: Normal rate, regular rhythm and normal heart sounds. No murmur heard.  Pulmonary/Chest: Effort normal and breath sounds normal. No respiratory distress. No wheezes. No rales. Exhibits no tenderness.  Abdominal: Soft. Bowel sounds are normal. Exhibits no distension and no mass. There is no tenderness. There is no rebound and no guarding.  No CVA tenderness.  Musculoskeletal: Normal range of motion. Exhibits no edema and no tenderness.  Lymphadenopathy: No cervical adenopathy.  Neurological: Alert and oriented to person, place, and time. No cranial nerve deficit. Normal strength sensation and reflexes throughout.  Skin: Skin is warm and dry. No rash noted.      ED Course   Procedures  Labs Reviewed   URINALYSIS, REFLEX TO URINE CULTURE - Abnormal; Notable for the following components:       Result Value    Appearance, UA Hazy (*)     Protein, UA 1+ (*)     Occult Blood UA 3+ (*)     Leukocytes, UA 1+ (*)     All other components within normal limits    Narrative:     Preferred Collection Type->Urine, Clean Catch  Specimen Source->Urine  Collection Type->Urine, Clean Catch   URINALYSIS MICROSCOPIC - Abnormal; Notable for the following components:     RBC, UA 5 (*)     Bacteria Few (*)     All other components within normal limits    Narrative:     Preferred Collection Type->Urine, Clean Catch  Specimen Source->Urine  Collection Type->Urine, Clean Catch   PREGNANCY TEST, URINE RAPID    Narrative:     Specimen Source->Urine   CBC W/ AUTO DIFFERENTIAL   COMPREHENSIVE METABOLIC PANEL   LIPASE   URINALYSIS, REFLEX TO URINE CULTURE          Imaging Results    None          Medications   aluminum-magnesium hydroxide-simethicone 200-200-20 mg/5 mL suspension 30 mL (30 mLs Oral Given 5/15/22 1233)     And   LIDOcaine HCl 2% oral solution 10 mL (10 mLs Oral Given 5/15/22 1233)                          Clinical Impression:   Final diagnoses:  [R10.9] Abdominal pain, unspecified abdominal location (Primary)       Symptoms resolved after GI cocktail, serial exam remains benign    History exam and workup consistent with gastritis, UA also suggests possible UTI, no indication of acute abdominal pelvic surgical emergency condition or intra-abdominal abscess or infection no indication pancreatitis no indication of acute biliary obstruction infection no indication of bowel obstruction diverticulitis appendicitis.  No indication of pneumonia PE aortic vascular pathology or other cardiovascular or respiratory emergency condition.  Appears stable and appropriate for outpatient management with close follow-up.  Probable sick prescribed in the ED for presumed gastritis.  Bactrim prescribed given urine findings.  Discussed findings and plan with patient along with reasons for return to the emergency department.   ED Disposition Condition    Discharge Stable        ED Prescriptions     Medication Sig Dispense Start Date End Date Auth. Provider    omeprazole (PRILOSEC) 20 MG capsule Take 1 capsule (20 mg total) by mouth once daily. 30 capsule 5/15/2022 5/15/2023 Ace Harrison MD        Follow-up Information     Follow up With Specialties Details Why Contact Info    Ryder CARTWRIGHT  MD [facility] Go in 3 days For re-evaluation follow-up as we discussed [address]  [facility] [id number] [phone]             Ace Harrison MD  05/15/22 6166

## 2023-04-16 ENCOUNTER — HOSPITAL ENCOUNTER (EMERGENCY)
Facility: HOSPITAL | Age: 53
Discharge: HOME | End: 2023-04-16
Attending: EMERGENCY MEDICINE
Payer: MEDICAID

## 2023-04-16 VITALS
HEIGHT: 62 IN | TEMPERATURE: 99 F | WEIGHT: 293 LBS | DIASTOLIC BLOOD PRESSURE: 70 MMHG | SYSTOLIC BLOOD PRESSURE: 126 MMHG | OXYGEN SATURATION: 99 % | BODY MASS INDEX: 53.92 KG/M2 | HEART RATE: 90 BPM | RESPIRATION RATE: 20 BRPM

## 2023-04-16 DIAGNOSIS — M25.551 RIGHT HIP PAIN: ICD-10-CM

## 2023-04-16 DIAGNOSIS — E87.6 HYPOKALEMIA: ICD-10-CM

## 2023-04-16 DIAGNOSIS — R10.13 EPIGASTRIC PAIN: Primary | ICD-10-CM

## 2023-04-16 LAB
ALBUMIN SERPL BCP-MCNC: 3.9 G/DL (ref 3.5–5.2)
ALP SERPL-CCNC: 87 U/L (ref 38–126)
ALT SERPL W/O P-5'-P-CCNC: 18 U/L (ref 10–44)
ANION GAP SERPL CALC-SCNC: 9 MMOL/L (ref 8–16)
AST SERPL-CCNC: 24 U/L (ref 15–46)
BACTERIA #/AREA URNS AUTO: ABNORMAL /HPF
BASOPHILS # BLD AUTO: 0.03 K/UL (ref 0–0.2)
BASOPHILS NFR BLD: 0.4 % (ref 0–1.9)
BILIRUB SERPL-MCNC: 0.6 MG/DL (ref 0.1–1)
BILIRUB UR QL STRIP: NEGATIVE
CALCIUM SERPL-MCNC: 8.8 MG/DL (ref 8.7–10.5)
CHLORIDE SERPL-SCNC: 104 MMOL/L (ref 95–110)
CLARITY UR REFRACT.AUTO: CLEAR
CO2 SERPL-SCNC: 24 MMOL/L (ref 23–29)
COLOR UR AUTO: YELLOW
CREAT SERPL-MCNC: 0.72 MG/DL (ref 0.5–1.4)
DIFFERENTIAL METHOD: ABNORMAL
EOSINOPHIL # BLD AUTO: 0 K/UL (ref 0–0.5)
EOSINOPHIL NFR BLD: 0.1 % (ref 0–8)
ERYTHROCYTE [DISTWIDTH] IN BLOOD BY AUTOMATED COUNT: 12.3 % (ref 11.5–14.5)
EST. GFR  (NO RACE VARIABLE): >60 ML/MIN/1.73 M^2
GLUCOSE SERPL-MCNC: 117 MG/DL (ref 70–110)
GLUCOSE UR QL STRIP: NEGATIVE
HCT VFR BLD AUTO: 39.2 % (ref 37–48.5)
HGB BLD-MCNC: 12.8 G/DL (ref 12–16)
HGB UR QL STRIP: ABNORMAL
IMM GRANULOCYTES # BLD AUTO: 0.04 K/UL (ref 0–0.04)
IMM GRANULOCYTES NFR BLD AUTO: 0.5 % (ref 0–0.5)
KETONES UR QL STRIP: NEGATIVE
LEUKOCYTE ESTERASE UR QL STRIP: NEGATIVE
LIPASE SERPL-CCNC: 113 U/L (ref 23–300)
LYMPHOCYTES # BLD AUTO: 1.4 K/UL (ref 1–4.8)
LYMPHOCYTES NFR BLD: 17.1 % (ref 18–48)
MAGNESIUM SERPL-MCNC: 1.6 MG/DL (ref 1.6–2.6)
MCH RBC QN AUTO: 28.9 PG (ref 27–31)
MCHC RBC AUTO-ENTMCNC: 32.7 G/DL (ref 32–36)
MCV RBC AUTO: 89 FL (ref 82–98)
MICROSCOPIC COMMENT: ABNORMAL
MONOCYTES # BLD AUTO: 0.6 K/UL (ref 0.3–1)
MONOCYTES NFR BLD: 7.7 % (ref 4–15)
NEUTROPHILS # BLD AUTO: 6.1 K/UL (ref 1.8–7.7)
NEUTROPHILS NFR BLD: 74.2 % (ref 38–73)
NITRITE UR QL STRIP: NEGATIVE
NRBC BLD-RTO: 0 /100 WBC
PH UR STRIP: 7 [PH] (ref 5–8)
PLATELET # BLD AUTO: 332 K/UL (ref 150–450)
PMV BLD AUTO: 9.7 FL (ref 9.2–12.9)
POCT GLUCOSE: 122 MG/DL (ref 70–110)
POTASSIUM SERPL-SCNC: 3.2 MMOL/L (ref 3.5–5.1)
PROT SERPL-MCNC: 7.5 G/DL (ref 6–8.4)
PROT UR QL STRIP: ABNORMAL
RBC # BLD AUTO: 4.43 M/UL (ref 4–5.4)
RBC #/AREA URNS AUTO: 3 /HPF (ref 0–4)
SODIUM SERPL-SCNC: 137 MMOL/L (ref 136–145)
SP GR UR STRIP: 1.02 (ref 1–1.03)
URN SPEC COLLECT METH UR: ABNORMAL
UROBILINOGEN UR STRIP-ACNC: ABNORMAL EU/DL
UUN UR-MCNC: 12 MG/DL (ref 7–17)
WBC # BLD AUTO: 8.23 K/UL (ref 3.9–12.7)
WBC #/AREA URNS AUTO: 0 /HPF (ref 0–5)
WBC CLUMPS UR QL AUTO: ABNORMAL
YEAST UR QL AUTO: ABNORMAL

## 2023-04-16 PROCEDURE — 83690 ASSAY OF LIPASE: CPT | Mod: ER | Performed by: EMERGENCY MEDICINE

## 2023-04-16 PROCEDURE — 96374 THER/PROPH/DIAG INJ IV PUSH: CPT | Mod: ER

## 2023-04-16 PROCEDURE — 85025 COMPLETE CBC W/AUTO DIFF WBC: CPT | Mod: ER | Performed by: EMERGENCY MEDICINE

## 2023-04-16 PROCEDURE — 81000 URINALYSIS NONAUTO W/SCOPE: CPT | Mod: ER | Performed by: EMERGENCY MEDICINE

## 2023-04-16 PROCEDURE — 96361 HYDRATE IV INFUSION ADD-ON: CPT | Mod: ER

## 2023-04-16 PROCEDURE — 63600175 PHARM REV CODE 636 W HCPCS: Mod: ER | Performed by: EMERGENCY MEDICINE

## 2023-04-16 PROCEDURE — 80053 COMPREHEN METABOLIC PANEL: CPT | Mod: ER | Performed by: EMERGENCY MEDICINE

## 2023-04-16 PROCEDURE — 83735 ASSAY OF MAGNESIUM: CPT | Mod: ER | Performed by: EMERGENCY MEDICINE

## 2023-04-16 PROCEDURE — 25000003 PHARM REV CODE 250: Mod: ER | Performed by: EMERGENCY MEDICINE

## 2023-04-16 PROCEDURE — 99284 EMERGENCY DEPT VISIT MOD MDM: CPT | Mod: 25,ER

## 2023-04-16 PROCEDURE — 82962 GLUCOSE BLOOD TEST: CPT | Mod: ER

## 2023-04-16 RX ORDER — PROCHLORPERAZINE EDISYLATE 5 MG/ML
10 INJECTION INTRAMUSCULAR; INTRAVENOUS
Status: COMPLETED | OUTPATIENT
Start: 2023-04-16 | End: 2023-04-16

## 2023-04-16 RX ORDER — CYCLOBENZAPRINE HCL 10 MG
10 TABLET ORAL EVERY 8 HOURS PRN
Qty: 14 TABLET | Refills: 0 | Status: SHIPPED | OUTPATIENT
Start: 2023-04-16 | End: 2023-04-21

## 2023-04-16 RX ORDER — ONDANSETRON 4 MG/1
4 TABLET, ORALLY DISINTEGRATING ORAL EVERY 6 HOURS PRN
Qty: 10 TABLET | Refills: 0 | Status: SHIPPED | OUTPATIENT
Start: 2023-04-16 | End: 2024-01-17 | Stop reason: ALTCHOICE

## 2023-04-16 RX ORDER — SUCRALFATE 1 G/10ML
1 SUSPENSION ORAL 4 TIMES DAILY
Qty: 414 ML | Refills: 0 | Status: SHIPPED | OUTPATIENT
Start: 2023-04-16

## 2023-04-16 RX ADMIN — POTASSIUM BICARBONATE 50 MEQ: 978 TABLET, EFFERVESCENT ORAL at 11:04

## 2023-04-16 RX ADMIN — SODIUM CHLORIDE 500 ML: 0.9 INJECTION, SOLUTION INTRAVENOUS at 10:04

## 2023-04-16 RX ADMIN — SODIUM CHLORIDE 1000 ML: 0.9 INJECTION, SOLUTION INTRAVENOUS at 10:04

## 2023-04-16 RX ADMIN — PROCHLORPERAZINE EDISYLATE 10 MG: 5 INJECTION INTRAMUSCULAR; INTRAVENOUS at 10:04

## 2023-04-16 NOTE — DISCHARGE INSTRUCTIONS
I recommend taking ibuprofen 600 mg every 6 hours with food and/or Tylenol 650 mg every 6 hours in addition to the prescribed medication to help manage your symptoms.  Please call your primary care physician in the morning for a follow-up appointment for further evaluation and management.  Please return with any new or worsening symptoms.

## 2023-04-16 NOTE — ED PROVIDER NOTES
Encounter Date: 4/16/2023       History     Chief Complaint   Patient presents with    Abdominal Pain     Patient has c/o of abd pain x 2 weeks. S/p colonoscopy 2 weeks ago. Denies difficulty with urinating or having bowel movements.     Leg Pain     Pain in bilateral legs since yesterday. Denies injury.      52-year-old female presents to the emergency department complaining of epigastric pain as well as right hip pain.  Patient has history of chronic epigastric pain, states she had an EGD a week or 2 ago.  She had some biopsies performed.  The medication she was prescribed afterward was unavailable at her pharmacy.  Notes some persistent burning pain worse with lying supine and eating and without alleviating factors.  Also notes some right hip pain that developed a day or 2 ago.  Radiates down the right lateral side of her thigh.  Worse with ambulation and certain movements and positions without alleviating factors.  Denies any recent trauma.  No other symptoms reported at this time.     Review of patient's allergies indicates:  No Known Allergies  Past Medical History:   Diagnosis Date    Diabetes mellitus     GERD (gastroesophageal reflux disease)     Hypertension      Past Surgical History:   Procedure Laterality Date    CHOLECYSTECTOMY      TUBAL LIGATION       History reviewed. No pertinent family history.  Social History     Tobacco Use    Smoking status: Never    Smokeless tobacco: Never   Substance Use Topics    Alcohol use: No    Drug use: No     Review of Systems   Constitutional:  Negative for chills and fever.   HENT:  Negative for congestion.    Respiratory:  Negative for cough and shortness of breath.    Cardiovascular:  Negative for chest pain.   Gastrointestinal:  Positive for abdominal pain.   Musculoskeletal:  Negative for back pain.   Neurological:  Negative for light-headedness and headaches.     Physical Exam     Initial Vitals [04/16/23 0934]   BP Pulse Resp Temp SpO2   (!) 140/78 77 20 98.6  °F (37 °C) 100 %      MAP       --         Physical Exam    Nursing note and vitals reviewed.  Constitutional: She appears well-developed and well-nourished. No distress.   HENT:   Head: Normocephalic and atraumatic.   Eyes: Conjunctivae and EOM are normal. Pupils are equal, round, and reactive to light.   Neck: Neck supple. No tracheal deviation present.   Normal range of motion.  Cardiovascular:  Normal rate and intact distal pulses.           Pulmonary/Chest: No respiratory distress.   Abdominal: Abdomen is soft. She exhibits no distension. There is no abdominal tenderness.   Musculoskeletal:         General: Tenderness (Minimal, right lateral hip) present. No edema. Normal range of motion.      Cervical back: Normal range of motion and neck supple.     Neurological: She is alert and oriented to person, place, and time. She has normal strength. No cranial nerve deficit. GCS score is 15. GCS eye subscore is 4. GCS verbal subscore is 5. GCS motor subscore is 6.   Skin: Skin is warm and dry.       ED Course   Procedures  Labs Reviewed   CBC W/ AUTO DIFFERENTIAL - Abnormal; Notable for the following components:       Result Value    Gran % 74.2 (*)     Lymph % 17.1 (*)     All other components within normal limits   COMPREHENSIVE METABOLIC PANEL - Abnormal; Notable for the following components:    Potassium 3.2 (*)     Glucose 117 (*)     All other components within normal limits   URINALYSIS - Abnormal; Notable for the following components:    Protein, UA Trace (*)     Occult Blood UA 1+ (*)     Urobilinogen, UA 2.0-3.0 (*)     All other components within normal limits    Narrative:     Collection Type->Urine, Clean Catch   URINALYSIS MICROSCOPIC - Abnormal; Notable for the following components:    Yeast, UA Rare (*)     All other components within normal limits    Narrative:     Collection Type->Urine, Clean Catch   POCT GLUCOSE - Abnormal; Notable for the following components:    POCT Glucose 122 (*)     All other  components within normal limits   LIPASE   MAGNESIUM   POCT GLUCOSE MONITORING CONTINUOUS          Imaging Results    None          Medications   sodium chloride 0.9% bolus 1,000 mL 1,000 mL (0 mLs Intravenous Stopped 4/16/23 1114)   sodium chloride 0.9% bolus 500 mL 500 mL (0 mLs Intravenous Stopped 4/16/23 1114)   prochlorperazine injection Soln 10 mg (10 mg Intravenous Given 4/16/23 1015)   potassium bicarbonate disintegrating tablet 50 mEq (50 mEq Oral Given 4/16/23 1118)     Medical Decision Making:   History:   Old Medical Records: I decided to obtain old medical records.  Old Records Summarized: records from clinic visits.       <> Summary of Records: Reviewed recent GI visit documenting baseline medications and past medical history  Initial Assessment:   52-year-old female presents emergency department complaining of epigastric pain and right lateral hip pain  Differential Diagnosis:   Diverticulitis, cholecystitis, pancreatitis, appendicitis, obstruction, constipation UTI, pyelonephritis, kidney stone, gastroenteritis, sprain, strain, radiculopathy, arthritis  Clinical Tests:   Lab Tests: Reviewed       <> Summary of Lab: CBC without leukocytosis, normal H&H; CMP with normal renal and liver function tests, mild hypokalemia; urinalysis without overt signs of infection; lipase negative  ED Management:  Oral potassium was ordered.  Patient was also given some IV fluid and Compazine.  While I was in with another patient, this patient told the nurse she needed to leave to go take care of an elderly family member.  I was unable to  the patient on leaving prior to a complete workup.  I had pertinent prescriptions for Carafate and Zofran and Flexeril.  These were given to the patient with discharge paperwork as she left.                        Clinical Impression:   Final diagnoses:  [R10.13] Epigastric pain (Primary)  [M25.551] Right hip pain  [E87.6] Hypokalemia        ED Disposition Condition    Discharge  Stable          ED Prescriptions       Medication Sig Dispense Start Date End Date Auth. Provider    ondansetron (ZOFRAN-ODT) 4 MG TbDL Take 1 tablet (4 mg total) by mouth every 6 (six) hours as needed (Nausea). 10 tablet 4/16/2023 -- Ger Arce MD    sucralfate (CARAFATE) 100 mg/mL suspension Take 10 mLs (1 g total) by mouth 4 (four) times daily. 414 mL 4/16/2023 -- Ger Arce MD    cyclobenzaprine (FLEXERIL) 10 MG tablet Take 1 tablet (10 mg total) by mouth every 8 (eight) hours as needed for Muscle spasms. 14 tablet 4/16/2023 4/21/2023 Ger Arce MD          Follow-up Information       Follow up With Specialties Details Why Contact Info    Ryder Rodríguez MD Family Medicine Schedule an appointment as soon as possible for a visit   [address] [place] [id number] [phone]               Ger Arce MD  04/16/23 9581

## 2023-11-12 ENCOUNTER — HOSPITAL ENCOUNTER (EMERGENCY)
Facility: HOSPITAL | Age: 53
Discharge: HOME | End: 2023-11-12
Attending: EMERGENCY MEDICINE
Payer: MEDICAID

## 2023-11-12 VITALS
DIASTOLIC BLOOD PRESSURE: 109 MMHG | TEMPERATURE: 99 F | SYSTOLIC BLOOD PRESSURE: 140 MMHG | BODY MASS INDEX: 50.02 KG/M2 | HEART RATE: 85 BPM | OXYGEN SATURATION: 99 % | RESPIRATION RATE: 20 BRPM | WEIGHT: 293 LBS | HEIGHT: 64 IN

## 2023-11-12 DIAGNOSIS — M25.512 ACUTE PAIN OF LEFT SHOULDER: ICD-10-CM

## 2023-11-12 DIAGNOSIS — J06.9 VIRAL URI WITH COUGH: Primary | ICD-10-CM

## 2023-11-12 LAB
B-HCG UR QL: NEGATIVE
CTP QC/QA: YES
INFLUENZA A, MOLECULAR: NEGATIVE
INFLUENZA B, MOLECULAR: NEGATIVE
SARS-COV-2 RDRP RESP QL NAA+PROBE: NEGATIVE
SPECIMEN SOURCE: NORMAL

## 2023-11-12 PROCEDURE — U0002 COVID-19 LAB TEST NON-CDC: HCPCS | Mod: ER | Performed by: EMERGENCY MEDICINE

## 2023-11-12 PROCEDURE — 81025 URINE PREGNANCY TEST: CPT | Mod: ER | Performed by: NURSE PRACTITIONER

## 2023-11-12 PROCEDURE — 99284 EMERGENCY DEPT VISIT MOD MDM: CPT | Mod: ER

## 2023-11-12 PROCEDURE — 87502 INFLUENZA DNA AMP PROBE: CPT | Mod: ER | Performed by: EMERGENCY MEDICINE

## 2023-11-12 PROCEDURE — 25000003 PHARM REV CODE 250: Mod: ER | Performed by: NURSE PRACTITIONER

## 2023-11-12 RX ORDER — BENZONATATE 200 MG/1
200 CAPSULE ORAL 3 TIMES DAILY PRN
Qty: 20 CAPSULE | Refills: 0 | Status: SHIPPED | OUTPATIENT
Start: 2023-11-12 | End: 2023-11-22

## 2023-11-12 RX ORDER — IBUPROFEN 600 MG/1
600 TABLET ORAL
Status: COMPLETED | OUTPATIENT
Start: 2023-11-12 | End: 2023-11-12

## 2023-11-12 RX ORDER — DICLOFENAC SODIUM 75 MG/1
75 TABLET, DELAYED RELEASE ORAL 2 TIMES DAILY
Qty: 60 TABLET | Refills: 0 | Status: SHIPPED | OUTPATIENT
Start: 2023-11-12

## 2023-11-12 RX ORDER — CYCLOBENZAPRINE HCL 10 MG
10 TABLET ORAL 3 TIMES DAILY PRN
Qty: 15 TABLET | Refills: 0 | Status: SHIPPED | OUTPATIENT
Start: 2023-11-12 | End: 2023-11-17

## 2023-11-12 RX ADMIN — IBUPROFEN 600 MG: 600 TABLET, FILM COATED ORAL at 05:11

## 2023-11-12 NOTE — FIRST PROVIDER EVALUATION
Emergency Department TeleTriage Encounter Note      CHIEF COMPLAINT    Chief Complaint   Patient presents with    Cough     Pt C/O dry cough and HA over R eye X 4 days.    Shoulder Pain     Pt C/O L shoulder pain with radiation through L arm. Pt reports pain began when picking up heavy object.  Pt reports pain exacerbated with coughing.       VITAL SIGNS   Initial Vitals [11/12/23 1640]   BP Pulse Resp Temp SpO2   (!) 142/97 86 19 98.9 °F (37.2 °C) 99 %      MAP       --            ALLERGIES    Review of patient's allergies indicates:  No Known Allergies    PROVIDER TRIAGE NOTE  This is a teletriage evaluation of a 53 y.o. female presenting to the ED complaining of cough and headache for four days. Also left shoulder pain after lifting a heavy object.     Initial orders will be placed and care will be transferred to an alternate provider when patient is roomed for a full evaluation. Any additional orders and the final disposition will be determined by that provider.         ORDERS  Labs Reviewed   SARS-COV-2 RDRP GENE   POCT INFLUENZA A/B MOLECULAR       ED Orders (720h ago, onward)      Start Ordered     Status Ordering Provider    11/12/23 1700 11/12/23 1647  ibuprofen tablet 600 mg  ED 1 Time         Ordered RADHA REED N.    11/12/23 1648 11/12/23 1647  POCT COVID-19 Rapid Screening  Once         Ordered RADHA REED N.    11/12/23 1648 11/12/23 1647  POCT Influenza A/B Molecular  Once         Ordered RADHA REED.    Unscheduled 11/12/23 1647  POCT urine pregnancy  Once         Ordered RADHA REED              Virtual Visit Note: The provider triage portion of this emergency department evaluation and documentation was performed via [employer], a HIPAA-compliant telemedicine application, in concert with a tele-presenter in the room. A face to face patient evaluation with one of my colleagues will occur once the patient is placed in an emergency department  room.      DISCLAIMER: This note was prepared with [employer] voice recognition transcription software. Garbled syntax, mangled pronouns, and other bizarre constructions may be attributed to that software system.

## 2023-11-13 NOTE — ED NOTES
Review of patient's allergies indicates:  No Known Allergies     Patient has verified the spelling of the name and  on armband.   APPEARANCE: Patient is alert, calm, oriented x 4, and does not appear distressed.  SKIN: Skin is normal for race, warm, and dry. Normal skin turgor and mucous membranes moist.  CARDIAC: Normal rate and rhythm, no murmur heard.   RESPIRATORY:Normal rate and effort. Breath sounds clear bilaterally throughout chest. Respirations are equal and unlabored.  Frequent dry cough.   GASTRO: Bowel sounds normal, abdomen is soft, no tenderness, and no abdominal distention.  MUSCLE: Full ROM. No bony tenderness or soft tissue tenderness. No obvious deformity. C/o left shoulder pain that is worse with coughing  PERIPHERAL VASCULAR: peripheral pulses present. Normal cap refill. No edema. Warm to touch.  NEURO: 5/5 strength major flexors/extensors bilaterally. Sensory intact to light touch bilaterally. Sherburne coma scale: eyes open spontaneously-4, oriented & converses-5, obeys commands-6. No neurological abnormalities.   MENTAL STATUS: awake, alert and aware of environment.  EYE: No overt deficits noted. No drainage. Sclera WNL  ENT: EARS: no obvious drainage. NOSE: no active bleeding. THROAT: no redness or swelling.  : Voids without complication per patient

## 2024-01-17 ENCOUNTER — HOSPITAL ENCOUNTER (EMERGENCY)
Facility: HOSPITAL | Age: 54
Discharge: HOME | End: 2024-01-17
Attending: EMERGENCY MEDICINE
Payer: MEDICAID

## 2024-01-17 VITALS
RESPIRATION RATE: 16 BRPM | BODY MASS INDEX: 47.8 KG/M2 | WEIGHT: 280 LBS | HEART RATE: 76 BPM | OXYGEN SATURATION: 98 % | SYSTOLIC BLOOD PRESSURE: 176 MMHG | TEMPERATURE: 98 F | DIASTOLIC BLOOD PRESSURE: 80 MMHG | HEIGHT: 64 IN

## 2024-01-17 DIAGNOSIS — U07.1 COVID-19: Primary | ICD-10-CM

## 2024-01-17 DIAGNOSIS — J02.9 SORE THROAT: ICD-10-CM

## 2024-01-17 DIAGNOSIS — R05.9 COUGH, UNSPECIFIED TYPE: ICD-10-CM

## 2024-01-17 DIAGNOSIS — R09.81 NASAL CONGESTION: ICD-10-CM

## 2024-01-17 LAB
GROUP A STREP, MOLECULAR: NEGATIVE
INFLUENZA A, MOLECULAR: NEGATIVE
INFLUENZA B, MOLECULAR: NEGATIVE
SARS-COV-2 RDRP RESP QL NAA+PROBE: POSITIVE
SPECIMEN SOURCE: NORMAL

## 2024-01-17 PROCEDURE — 87502 INFLUENZA DNA AMP PROBE: CPT | Mod: ER | Performed by: EMERGENCY MEDICINE

## 2024-01-17 PROCEDURE — 87651 STREP A DNA AMP PROBE: CPT | Mod: ER | Performed by: EMERGENCY MEDICINE

## 2024-01-17 PROCEDURE — 99284 EMERGENCY DEPT VISIT MOD MDM: CPT | Mod: ER

## 2024-01-17 PROCEDURE — U0002 COVID-19 LAB TEST NON-CDC: HCPCS | Mod: ER | Performed by: EMERGENCY MEDICINE

## 2024-01-17 RX ORDER — BENZONATATE 100 MG/1
100 CAPSULE ORAL 3 TIMES DAILY PRN
Qty: 21 CAPSULE | Refills: 0 | Status: SHIPPED | OUTPATIENT
Start: 2024-01-17 | End: 2024-01-27

## 2024-01-17 RX ORDER — AZELASTINE 1 MG/ML
1 SPRAY, METERED NASAL 2 TIMES DAILY
Qty: 30 ML | Refills: 0 | Status: SHIPPED | OUTPATIENT
Start: 2024-01-17 | End: 2025-01-16

## 2024-01-17 RX ORDER — PROMETHAZINE HYDROCHLORIDE AND DEXTROMETHORPHAN HYDROBROMIDE 6.25; 15 MG/5ML; MG/5ML
5 SYRUP ORAL EVERY 4 HOURS PRN
Qty: 100 ML | Refills: 0 | Status: SHIPPED | OUTPATIENT
Start: 2024-01-17 | End: 2024-01-27

## 2024-01-17 RX ORDER — AMOXICILLIN 500 MG/1
500 CAPSULE ORAL 3 TIMES DAILY
Qty: 21 CAPSULE | Refills: 0 | Status: SHIPPED | OUTPATIENT
Start: 2024-01-17 | End: 2024-01-24

## 2024-01-17 NOTE — Clinical Note
"Hernando"Bernarda Lugo was seen and treated in our emergency department on 1/17/2024.  She may return to work on 01/22/2024.       If you have any questions or concerns, please don't hesitate to call.      Arslan Dudley PA-C"

## 2024-01-17 NOTE — DISCHARGE INSTRUCTIONS
Quarantine until 1/22/2024.  Use mask for an additional 5 days.   You tested negative for strep today but since you have been in close contact with someone with strep, there is a chance you may get strep.  If your sore throat does not improve, take the antibiotic you were given.

## 2024-01-17 NOTE — ED PROVIDER NOTES
Encounter Date: 1/17/2024       History     Chief Complaint   Patient presents with    Cough     Cough, congested, sore throat, & body aches since yesterday      53-year-old patient presents to the emergency department with cough congestion sore throat and body aches.  The symptoms began yesterday.  Patient states that she has been trying to take TheraFlu and Tylenol and using nasal sprays all of which have not helped.  Patient denied any shortness of breath or chest pain.    The history is provided by the patient.     Review of patient's allergies indicates:  No Known Allergies  Past Medical History:   Diagnosis Date    Diabetes mellitus     GERD (gastroesophageal reflux disease)     Hypertension      Past Surgical History:   Procedure Laterality Date    CHOLECYSTECTOMY      TUBAL LIGATION       History reviewed. No pertinent family history.  Social History     Tobacco Use    Smoking status: Never    Smokeless tobacco: Never   Substance Use Topics    Alcohol use: No    Drug use: No     Review of Systems   Constitutional:  Positive for chills.   HENT:  Positive for rhinorrhea and sore throat.    Eyes: Negative.    Respiratory:  Positive for cough.    Cardiovascular: Negative.    Gastrointestinal: Negative.    Endocrine: Negative.    Genitourinary: Negative.    Musculoskeletal:  Positive for myalgias.   Skin: Negative.    Allergic/Immunologic: Negative.    Neurological: Negative.    Hematological: Negative.    Psychiatric/Behavioral: Negative.         Physical Exam     Initial Vitals [01/17/24 0815]   BP Pulse Resp Temp SpO2   (!) 176/80 76 16 97.8 °F (36.6 °C) 98 %      MAP       --         Physical Exam    Constitutional: She appears well-developed and well-nourished.   HENT:   Head: Normocephalic.   Right Ear: Tympanic membrane normal.   Left Ear: Tympanic membrane normal.   Nose: Rhinorrhea present.   Mouth/Throat: Uvula is midline and oropharynx is clear and moist.   Eyes: Conjunctivae, EOM and lids are normal.    Neck:    Full passive range of motion without pain.     Cardiovascular:  Normal rate and regular rhythm.           Pulmonary/Chest: Effort normal and breath sounds normal.   Musculoskeletal:      Cervical back: Full passive range of motion without pain.           ED Course   Procedures  Labs Reviewed   SARS-COV-2 RNA AMPLIFICATION, QUAL - Abnormal; Notable for the following components:       Result Value    SARS-CoV-2 RNA, Amplification, Qual Positive (*)     All other components within normal limits    Narrative:     Is the patient symptomatic?->Yes   INFLUENZA A & B BY MOLECULAR   GROUP A STREP, MOLECULAR          Imaging Results    None          Medications - No data to display  Medical Decision Making  53 year old patient presents with cough, congestion, sore throat, and body aches. Tested for flu, covid, and strep.                                        Clinical Impression:  Final diagnoses:  [R05.9] Cough, unspecified type  [R09.81] Nasal congestion  [J02.9] Sore throat  [U07.1] COVID-19 (Primary)          ED Disposition Condition    Discharge Stable          ED Prescriptions       Medication Sig Dispense Start Date End Date Auth. Provider    azelastine (ASTELIN) 137 mcg (0.1 %) nasal spray 1 spray (137 mcg total) by Nasal route 2 (two) times daily. 30 mL 1/17/2024 1/16/2025 Arslan Dudley PA-C    benzonatate (TESSALON) 100 MG capsule Take 1 capsule (100 mg total) by mouth 3 (three) times daily as needed for Cough. 21 capsule 1/17/2024 1/27/2024 Arslan Dudley PA-C    promethazine-dextromethorphan (PROMETHAZINE-DM) 6.25-15 mg/5 mL Syrp Take 5 mLs by mouth every 4 (four) hours as needed (cough). 100 mL 1/17/2024 1/27/2024 Arslan Dudley PA-C    amoxicillin (AMOXIL) 500 MG capsule Take 1 capsule (500 mg total) by mouth 3 (three) times daily. for 7 days 21 capsule 1/17/2024 1/24/2024 Arslan Dudley PA-C          Follow-up Information       Follow up With Specialties Details Why Contact Info    [facility] - Emergency Dept Emergency Medicine Go to  If symptoms worsen [address] [facility] [address] [phone]             Arslan Dudley PA-C  01/17/24 0920

## 2024-01-22 ENCOUNTER — TELEPHONE (OUTPATIENT)
Dept: ADMINISTRATIVE | Facility: CLINIC | Age: 54
End: 2024-01-22
Payer: MEDICAID

## 2024-01-22 NOTE — PROGRESS NOTES
Call returned to assist with scheduling. First attempt the person answered and held the phone. Attempt x 2, no answer. Encounter to be closed at this time.

## 2024-03-25 ENCOUNTER — HOSPITAL ENCOUNTER (EMERGENCY)
Facility: HOSPITAL | Age: 54
Discharge: HOME | End: 2024-03-25
Attending: EMERGENCY MEDICINE
Payer: MEDICAID

## 2024-03-25 VITALS
SYSTOLIC BLOOD PRESSURE: 141 MMHG | HEIGHT: 64 IN | WEIGHT: 293 LBS | TEMPERATURE: 99 F | HEART RATE: 92 BPM | BODY MASS INDEX: 50.02 KG/M2 | OXYGEN SATURATION: 97 % | RESPIRATION RATE: 18 BRPM | DIASTOLIC BLOOD PRESSURE: 73 MMHG

## 2024-03-25 DIAGNOSIS — J06.9 VIRAL URI WITH COUGH: Primary | ICD-10-CM

## 2024-03-25 LAB
GROUP A STREP, MOLECULAR: NEGATIVE
INFLUENZA A, MOLECULAR: NEGATIVE
INFLUENZA B, MOLECULAR: NEGATIVE
SARS-COV-2 RDRP RESP QL NAA+PROBE: NEGATIVE
SPECIMEN SOURCE: NORMAL

## 2024-03-25 PROCEDURE — 87502 INFLUENZA DNA AMP PROBE: CPT | Mod: ER | Performed by: EMERGENCY MEDICINE

## 2024-03-25 PROCEDURE — 25000003 PHARM REV CODE 250: Mod: ER

## 2024-03-25 PROCEDURE — 87651 STREP A DNA AMP PROBE: CPT | Mod: ER | Performed by: EMERGENCY MEDICINE

## 2024-03-25 PROCEDURE — U0002 COVID-19 LAB TEST NON-CDC: HCPCS | Mod: ER | Performed by: EMERGENCY MEDICINE

## 2024-03-25 PROCEDURE — 99284 EMERGENCY DEPT VISIT MOD MDM: CPT | Mod: ER

## 2024-03-25 RX ORDER — AZELASTINE 1 MG/ML
1 SPRAY, METERED NASAL 2 TIMES DAILY
Qty: 30 ML | Refills: 0 | Status: SHIPPED | OUTPATIENT
Start: 2024-03-25 | End: 2025-03-25

## 2024-03-25 RX ORDER — CETIRIZINE HYDROCHLORIDE 10 MG/1
10 TABLET ORAL DAILY
Qty: 14 TABLET | Refills: 0 | Status: SHIPPED | OUTPATIENT
Start: 2024-03-25 | End: 2024-04-08

## 2024-03-25 RX ORDER — GUAIFENESIN 600 MG/1
1200 TABLET, EXTENDED RELEASE ORAL 2 TIMES DAILY
Qty: 20 TABLET | Refills: 0 | Status: SHIPPED | OUTPATIENT
Start: 2024-03-25 | End: 2024-03-30

## 2024-03-25 RX ORDER — IBUPROFEN 600 MG/1
600 TABLET ORAL
Status: COMPLETED | OUTPATIENT
Start: 2024-03-25 | End: 2024-03-25

## 2024-03-25 RX ORDER — BENZONATATE 100 MG/1
100 CAPSULE ORAL 2 TIMES DAILY
Qty: 10 CAPSULE | Refills: 0 | Status: SHIPPED | OUTPATIENT
Start: 2024-03-25 | End: 2024-03-30

## 2024-03-25 RX ADMIN — IBUPROFEN 600 MG: 600 TABLET, FILM COATED ORAL at 11:03

## 2024-03-25 NOTE — DISCHARGE INSTRUCTIONS
Thank you for letting me care for you today - it was nice to meet you and I hope you feel better soon. Please return to the ER if your symptoms don't improve or get worse. And be sure to follow up with your primary care provider within the next week if not improving.     Rotate between Tylenol and ibuprofen (Motrin), switching every 3 hours. For example, Tylenol at 8am, ibuprofen at 11am, tylenol at 2pm.  Do not take more than 3000 mg of Tylenol in 1 day or more than 2400 mg of ibuprofen and 1 day.     Our goal at [facility] is to always give you outstanding care and exceptional service. You may receive a survey by mail or email in the next week about your experience in our ED. We would greatly appreciate you completing and returning the survey. Your feedback provides us with a way to recognize our staff who give very good care and it helps us learn how to improve when your experience was below our aspiration of excellence.     All the best,     Deepali Godoy, MPH, PA-C  Emergency Department Physician Assistant  [facility], [facility]

## 2024-03-25 NOTE — Clinical Note
"Hernando"Bernarda Lugo was seen and treated in our emergency department on 3/25/2024.  She may return to work on 03/26/2024.       If you have any questions or concerns, please don't hesitate to call.      Deepali Godoy PA-C"

## 2024-03-26 NOTE — ED PROVIDER NOTES
Encounter Date: 3/25/2024       History     Chief Complaint   Patient presents with    Cough     Cough, headache, sore throat, leg aches, started this morning     Patient is a 53 y.o. female who presents with cough, headache, sore throat, generalized body aches that began this morning.  Patient does not reports close contacts with similar symptoms. Patient has taken no medication for symptom relief.  Denies fever, headache, chest pain, shortness of breath, wheezing, stridor, drooling, NVD, abdominal pain, constipation, urinary problems, joint problems, rashes, or any other complaints at this time.      The history is provided by the patient.     Review of patient's allergies indicates:  No Known Allergies  Past Medical History:   Diagnosis Date    Diabetes mellitus     GERD (gastroesophageal reflux disease)     Hypertension      Past Surgical History:   Procedure Laterality Date    CHOLECYSTECTOMY      TUBAL LIGATION       History reviewed. No pertinent family history.  Social History     Tobacco Use    Smoking status: Never    Smokeless tobacco: Never   Substance Use Topics    Alcohol use: No    Drug use: No     Review of Systems   Constitutional:  Negative for chills and fever.   HENT:  Positive for congestion, postnasal drip, rhinorrhea and sore throat. Negative for drooling, ear discharge, ear pain, facial swelling, trouble swallowing and voice change.    Respiratory:  Positive for cough. Negative for chest tightness, shortness of breath, wheezing and stridor.    Cardiovascular:  Negative for chest pain.   Gastrointestinal:  Negative for abdominal distention, abdominal pain, diarrhea, nausea and vomiting.   Genitourinary:  Negative for dysuria.   Musculoskeletal:  Positive for myalgias (generalized). Negative for back pain, neck pain and neck stiffness.   Skin:  Negative for rash.   Neurological:  Negative for weakness and headaches.   Hematological:  Does not bruise/bleed easily.   All other systems reviewed  and are negative.      Physical Exam     Initial Vitals [03/25/24 1112]   BP Pulse Resp Temp SpO2   (!) 141/73 92 18 98.7 °F (37.1 °C) 97 %      MAP       --         Physical Exam    Vitals reviewed.  Constitutional: She appears well-developed and well-nourished.   HENT:   Head: Normocephalic and atraumatic.   Nose: Rhinorrhea present.   Mouth/Throat: Uvula is midline. No oropharyngeal exudate, posterior oropharyngeal edema or posterior oropharyngeal erythema.   2+ tonsils, bilaterally.  Tonsils symmetrical. Tonsils are not erythematous.  There is not tonsillar exudate.  No apparent PTA. Uvula midline.  There is not cervical adenopathy.    Eyes: EOM are normal.   Neck:   Normal range of motion.  Cardiovascular:  Normal rate and regular rhythm.           Pulmonary/Chest: Breath sounds normal. No tachypnea. No respiratory distress. She has no decreased breath sounds. She has no wheezes. She has no rhonchi. She has no rales.   Abdominal: Abdomen is soft. She exhibits no distension. There is no abdominal tenderness. There is no rebound and no guarding.   Musculoskeletal:      Cervical back: Normal range of motion.     Lymphadenopathy:     She has no cervical adenopathy.   Neurological: She is alert and oriented to person, place, and time.         ED Course   Procedures  Labs Reviewed   INFLUENZA A & B BY MOLECULAR   GROUP A STREP, MOLECULAR   SARS-COV-2 RNA AMPLIFICATION, QUAL    Narrative:     Is the patient symptomatic?->Yes          Imaging Results    None          Medications   ibuprofen tablet 600 mg (600 mg Oral Given 3/25/24 1128)     Medical Decision Making  Patient is an afebrile, well-appearing 53 y.o. female. VSS. Denies fever, chest pain, SOB, wheezing, difficulty swallowing, neck pain, neck stiffness. Vital signs do not suggest sepsis. Lung sounds are clear and not consistent with pneumonia. There is no neck pain or limited ROM to suggest retropharyngeal abscess or meningitis. Tolerating PO, non-toxic  appearing, no hypoxia. The patient remained comfortable and stable during their visit in the ED.  Details of ED course documented in ED workup.     Differential Diagnosis includes, but is not limited to: COVID, influenza, viral URI, bacterial/viral pharyngitis    COVID test negative. Influenza test negative. Strep test negative.    All historical, clinical, and laboratory findings reviewed. Patient with constellation of symptoms most consistent with a viral URI. There are no concerning features on physical exam to suggest an emergent or life threatening condition or an invasive bacterial infection, including, but not limited to: bacterial otitis media/externa, sinusitis, pharyngitis, or peritonsillar abscess. No further intervention is indicated at this time. The patient is at low risk for an emergent/life threatening medical condition at this time, and I am of the belief that that it is safe to discharge the patient from the emergency department.     Patient instructed to follow up with PCP in 2-3 days for recheck of today's complaints. Patient has been counseled regarding the need for follow-up as well as the indications to return to the emergency room should new or worrisome developments. Discharge and follow-up instructions discussed with the patient who expressed understanding and willingness to comply with recommendations. Patient discharged from the emergency department in stable condition, in no acute distress.     Amount and/or Complexity of Data Reviewed  Labs:  Decision-making details documented in ED Course.    Risk  OTC drugs.  Prescription drug management.               ED Course as of 03/25/24 2218   Mon Mar 25, 2024   1124 Patient examined and assessed. Patient answering questions appropriately, speaking in complete sentences, respirations are even and unlabored.  AAO x 4.  [OB]   1142 Group A Strep, Molecular: Negative [OB]   1150 SARS-CoV-2 RNA, Amplification, Qual: Negative [OB]   1206 Group A  Strep, Molecular: Negative [OB]      ED Course User Index  [OB] Deepali Godoy PA-C                           Clinical Impression:  Final diagnoses:  [J06.9] Viral URI with cough (Primary)          ED Disposition Condition    Discharge Stable          ED Prescriptions       Medication Sig Dispense Start Date End Date Auth. Provider    cetirizine (ZYRTEC) 10 MG tablet Take 1 tablet (10 mg total) by mouth once daily. for 14 days 14 tablet 3/25/2024 4/8/2024 Deepali Godoy PA-C    benzonatate (TESSALON) 100 MG capsule Take 1 capsule (100 mg total) by mouth 2 (two) times a day. for 5 days 10 capsule 3/25/2024 3/30/2024 Deepali Godoy PA-C    guaiFENesin (MUCINEX) 600 mg 12 hr tablet Take 2 tablets (1,200 mg total) by mouth 2 (two) times daily. for 5 days 20 tablet 3/25/2024 3/30/2024 Deepali Godoy PA-C    azelastine (ASTELIN) 137 mcg (0.1 %) nasal spray 1 spray (137 mcg total) by Nasal route 2 (two) times daily. 30 mL 3/25/2024 3/25/2025 Deepali Godoy PA-C          Follow-up Information       Follow up With Specialties Details Why Contact Info    Ryder Rodríguez MD Family Medicine In 3 days As needed, If symptoms worsen [address] [place] [id number] [phone]               Deepali Godoy PA-C  03/25/24 1790

## 2024-05-08 DIAGNOSIS — R19.06 EPIGASTRIC MASS: Primary | ICD-10-CM

## 2024-05-14 ENCOUNTER — HOSPITAL ENCOUNTER (OUTPATIENT)
Dept: RADIOLOGY | Facility: HOSPITAL | Age: 54
Discharge: HOME | End: 2024-05-14
Attending: NURSE PRACTITIONER
Payer: MEDICAID

## 2024-05-14 DIAGNOSIS — R19.06 EPIGASTRIC MASS: ICD-10-CM

## 2024-05-14 PROCEDURE — 76700 US EXAM ABDOM COMPLETE: CPT | Mod: TC,PN

## 2024-05-14 PROCEDURE — 76700 US EXAM ABDOM COMPLETE: CPT | Mod: 26,,, | Performed by: RADIOLOGY

## 2024-08-01 ENCOUNTER — HOSPITAL ENCOUNTER (EMERGENCY)
Facility: HOSPITAL | Age: 54
Discharge: HOME | End: 2024-08-01
Attending: EMERGENCY MEDICINE
Payer: MEDICAID

## 2024-08-01 VITALS
HEIGHT: 63 IN | WEIGHT: 293 LBS | OXYGEN SATURATION: 100 % | TEMPERATURE: 98 F | RESPIRATION RATE: 20 BRPM | SYSTOLIC BLOOD PRESSURE: 133 MMHG | DIASTOLIC BLOOD PRESSURE: 70 MMHG | BODY MASS INDEX: 51.91 KG/M2 | HEART RATE: 67 BPM

## 2024-08-01 DIAGNOSIS — S80.12XA CONTUSION OF LEFT LOWER LEG, INITIAL ENCOUNTER: ICD-10-CM

## 2024-08-01 DIAGNOSIS — M79.605 PAIN OF LEFT LOWER EXTREMITY: Primary | ICD-10-CM
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
## 2024-08-01 PROCEDURE — 99283 EMERGENCY DEPT VISIT LOW MDM: CPT | Mod: 25,ER

## 2024-08-11 ENCOUNTER — HOSPITAL ENCOUNTER (EMERGENCY)
Facility: HOSPITAL | Age: 54
Discharge: HOME | End: 2024-08-11
Attending: EMERGENCY MEDICINE
Payer: MEDICAID

## 2024-08-11 VITALS
DIASTOLIC BLOOD PRESSURE: 80 MMHG | RESPIRATION RATE: 20 BRPM | HEIGHT: 62 IN | BODY MASS INDEX: 53.92 KG/M2 | HEART RATE: 105 BPM | WEIGHT: 293 LBS | OXYGEN SATURATION: 98 % | SYSTOLIC BLOOD PRESSURE: 140 MMHG | TEMPERATURE: 100 F

## 2024-08-11 DIAGNOSIS — J06.9 VIRAL URI WITH COUGH: Primary | ICD-10-CM

## 2024-08-11 LAB
GROUP A STREP, MOLECULAR: NEGATIVE
SARS-COV-2 RDRP RESP QL NAA+PROBE: NEGATIVE

## 2024-08-11 PROCEDURE — U0002 COVID-19 LAB TEST NON-CDC: HCPCS | Mod: ER | Performed by: EMERGENCY MEDICINE

## 2024-08-11 PROCEDURE — 87651 STREP A DNA AMP PROBE: CPT | Mod: ER | Performed by: EMERGENCY MEDICINE

## 2024-08-11 PROCEDURE — 99284 EMERGENCY DEPT VISIT MOD MDM: CPT | Mod: ER

## 2024-08-11 RX ORDER — DICLOFENAC SODIUM 75 MG/1
75 TABLET, DELAYED RELEASE ORAL 2 TIMES DAILY
Qty: 60 TABLET | Refills: 0 | Status: SHIPPED | OUTPATIENT
Start: 2024-08-11

## 2024-08-11 NOTE — Clinical Note
"Hernando"Bernarda Lugo was seen and treated in our emergency department on 8/11/2024.     COVID-19 is present in our communities across the state. There is limited testing for COVID at this time, so not all patients can be tested. In this situation, your employee meets the following criteria:    Hernando Lugo has met the criteria for COVID-19 testing and has a NEGATIVE result. The employee can return to work once they are asymptomatic for 24 hours without the use of fever reducing medications (Tylenol, Motrin, etc).     If the employee is not fully vaccinated and had a close contact:  · Retest at 5 to 7 days post-exposure  · If possible, it is recommended that they quarantine for 5 days from the time of contact regardless of their test status.  · A mask should be worn post quarantine for 5 days.  If you have any questions or concerns, or if I can be of further assistance, please do not hesitate to contact me.    Sincerely,             EMY Tafoya RN"

## 2024-08-11 NOTE — ED PROVIDER NOTES
Encounter Date: 8/11/2024       History     Chief Complaint   Patient presents with    Headache     Pt reports headache, sore throat, and cough since yesterday. Pt using OTC meds for sore throat without relief.      HPI  54 y.o.   Co frontal ha, ST, cough x 1 day  Not vaccinated for covid  No sick contacts  Min relief OTC meds    Review of patient's allergies indicates:  No Known Allergies  Past Medical History:   Diagnosis Date    Diabetes mellitus     GERD (gastroesophageal reflux disease)     Hypertension      Past Surgical History:   Procedure Laterality Date    CHOLECYSTECTOMY      TUBAL LIGATION       No family history on file.  Social History     Tobacco Use    Smoking status: Never    Smokeless tobacco: Never   Substance Use Topics    Alcohol use: No    Drug use: No     Review of Systems  All systems were reviewed/examined and were negative except as noted in the HPI.    Physical Exam     Initial Vitals [08/11/24 1807]   BP Pulse Resp Temp SpO2   (!) 140/80 105 20 100 °F (37.8 °C) 98 %      MAP       --         Physical Exam    General: the patient is awake, alert, and in no apparent distress.  Head: normocephalic and atraumatic, sclera are clear  Neck: supple without meningismus  OP wnl no signs of PTA, nl voice no stridor  Chest: clear to auscultation bilaterally, no respiratory distress  Heart: regular rate and rhythm borderline tachy  Extremities: warm and well perfused  Skin: warm and dry  Psych conversant  Neuro: awake, alert, moving all extremities    ED Course   Procedures  Labs Reviewed   GROUP A STREP, MOLECULAR       Result Value    Group A Strep, Molecular Negative     SARS-COV-2 RNA AMPLIFICATION, QUAL    SARS-CoV-2 RNA, Amplification, Qual Negative            Imaging Results    None          Medications - No data to display  Medical Decision Making  Risk  Prescription drug management.    Medical Decision Making:    This is an emergent evaluation of a patient presenting to the ED.  Nursing notes  were reviewed.  Differential Diagnosis:  Influenza, COVID-19, Viral Illness  Swabs neg  I personally reviewed and interpreted the laboratory results.    I decided to obtain and review old medical records, which showed: ED visits    Evaluation for Emergency Medical Condition  The patient received a medical screening exam and within a reasonable degree of clinical confidence an emergency medical condition has not been identified.  The patient is instructed on proper follow up and return precautions to the ED.    The patient was encouraged strongly to get the COVID-19 vaccine either after asymptomatic (if COVID positive) or offered it here in the ED is COVID negative.  The patient was also encouraged to obtain an influenza vaccination if available once asymptomatic (if positive) or if testing negative in the ED.      Alex Benz MD, AMNA                                    Clinical Impression:  Final diagnoses:  [J06.9] Viral URI with cough (Primary)          ED Disposition Condition    Discharge Stable          ED Prescriptions       Medication Sig Dispense Start Date End Date Auth. Provider    diclofenac (VOLTAREN) 75 MG EC tablet Take 1 tablet (75 mg total) by mouth 2 (two) times daily. 60 tablet 8/11/2024 -- Ace Benz MD    diphenhydrAMINE-aluminum-magnesium hydroxide-simethicone-LIDOcaine viscous HCl 2% Swish and spit 15 mLs every 4 (four) hours as needed. 200 each 8/11/2024 -- Ace Benz MD          Follow-up Information       Follow up With Specialties Details Why Contact Info    Ryder Rodríguez MD Family Medicine Schedule an appointment as soon as possible for a visit   [address] [place] [id number] [phone]            Discharged to home in stable condition, return to ED warnings given, follow up and patient care instructions given.      Alex Benz MD, AMNA, [facility]  Department of Emergency Medicine       Ace Benz MD  08/12/24 0354

## 2024-08-16 DIAGNOSIS — Z12.31 SCREENING MAMMOGRAM FOR BREAST CANCER: Primary | ICD-10-CM

## 2024-10-08 ENCOUNTER — HOSPITAL ENCOUNTER (OUTPATIENT)
Dept: RADIOLOGY | Facility: HOSPITAL | Age: 54
Discharge: HOME | End: 2024-10-08
Attending: NURSE PRACTITIONER
Payer: MEDICAID

## 2024-10-08 DIAGNOSIS — Z12.31 SCREENING MAMMOGRAM FOR BREAST CANCER: ICD-10-CM

## 2024-10-08 PROCEDURE — 77063 BREAST TOMOSYNTHESIS BI: CPT | Mod: 26,,, | Performed by: RADIOLOGY

## 2024-10-08 PROCEDURE — 77067 SCR MAMMO BI INCL CAD: CPT | Mod: 26,,, | Performed by: RADIOLOGY

## 2024-10-08 PROCEDURE — 77063 BREAST TOMOSYNTHESIS BI: CPT | Mod: TC,PN

## 2024-10-08 PROCEDURE — 77067 SCR MAMMO BI INCL CAD: CPT | Mod: TC,PN

## 2024-11-12 DIAGNOSIS — R19.06 EPIGASTRIC SWELLING OR MASS OR LUMP: Primary | ICD-10-CM

## 2024-11-26 ENCOUNTER — HOSPITAL ENCOUNTER (OUTPATIENT)
Dept: RADIOLOGY | Facility: HOSPITAL | Age: 54
Discharge: HOME | End: 2024-11-26
Attending: NURSE PRACTITIONER
Payer: MEDICAID

## 2024-11-26 DIAGNOSIS — R19.06 EPIGASTRIC SWELLING OR MASS OR LUMP: ICD-10-CM

## 2024-11-26 PROCEDURE — 74018 RADEX ABDOMEN 1 VIEW: CPT | Mod: 26,,, | Performed by: RADIOLOGY

## 2024-11-26 PROCEDURE — 74018 RADEX ABDOMEN 1 VIEW: CPT | Mod: TC,FY,PN

## 2024-11-26 PROCEDURE — 76700 US EXAM ABDOM COMPLETE: CPT | Mod: TC,PN

## 2024-11-26 PROCEDURE — 76700 US EXAM ABDOM COMPLETE: CPT | Mod: 26,,, | Performed by: RADIOLOGY

## 2025-04-09 ENCOUNTER — HOSPITAL ENCOUNTER (EMERGENCY)
Facility: HOSPITAL | Age: 55
Discharge: HOME | End: 2025-04-09
Attending: STUDENT IN AN ORGANIZED HEALTH CARE EDUCATION/TRAINING PROGRAM
Payer: MEDICAID

## 2025-04-09 VITALS
HEIGHT: 63 IN | RESPIRATION RATE: 20 BRPM | BODY MASS INDEX: 51.91 KG/M2 | DIASTOLIC BLOOD PRESSURE: 111 MMHG | SYSTOLIC BLOOD PRESSURE: 175 MMHG | TEMPERATURE: 98 F | HEART RATE: 74 BPM | OXYGEN SATURATION: 100 % | WEIGHT: 293 LBS

## 2025-04-09 DIAGNOSIS — J30.9 ALLERGIC RHINITIS, UNSPECIFIED SEASONALITY, UNSPECIFIED TRIGGER: ICD-10-CM

## 2025-04-09 DIAGNOSIS — R30.0 DYSURIA: Primary | ICD-10-CM

## 2025-04-09 DIAGNOSIS — J06.9 VIRAL URI: ICD-10-CM

## 2025-04-09 LAB
BACTERIA #/AREA URNS HPF: ABNORMAL /HPF
BILIRUB UR QL STRIP.AUTO: NEGATIVE
CLARITY UR: ABNORMAL
COLOR UR AUTO: YELLOW
GLUCOSE UR QL STRIP: NEGATIVE
HGB UR QL STRIP: ABNORMAL
HYALINE CASTS #/AREA URNS LPF: 0 /LPF (ref 0–1)
INFLUENZA A MOLECULAR (OHS): NEGATIVE
INFLUENZA B MOLECULAR (OHS): NEGATIVE
KETONES UR QL STRIP: NEGATIVE
LEUKOCYTE ESTERASE UR QL STRIP: ABNORMAL
MICROSCOPIC COMMENT: ABNORMAL
NITRITE UR QL STRIP: NEGATIVE
PH UR STRIP: 7 [PH]
PROT UR QL STRIP: ABNORMAL
RBC #/AREA URNS HPF: 50 /HPF (ref 0–4)
SARS-COV-2 RDRP RESP QL NAA+PROBE: NEGATIVE
SP GR UR STRIP: 1.02
UROBILINOGEN UR STRIP-ACNC: 1 EU/DL
WBC #/AREA URNS HPF: 1 /HPF (ref 0–5)

## 2025-04-09 PROCEDURE — U0002 COVID-19 LAB TEST NON-CDC: HCPCS | Mod: ER

## 2025-04-09 PROCEDURE — 99283 EMERGENCY DEPT VISIT LOW MDM: CPT | Mod: ER

## 2025-04-09 PROCEDURE — 81003 URINALYSIS AUTO W/O SCOPE: CPT | Mod: ER

## 2025-04-09 PROCEDURE — 87502 INFLUENZA DNA AMP PROBE: CPT | Mod: ER

## 2025-04-09 RX ORDER — NITROFURANTOIN 25; 75 MG/1; MG/1
100 CAPSULE ORAL 2 TIMES DAILY
Qty: 10 CAPSULE | Refills: 0 | Status: SHIPPED | OUTPATIENT
Start: 2025-04-09 | End: 2025-04-14

## 2025-04-09 RX ORDER — PHENAZOPYRIDINE HYDROCHLORIDE 200 MG/1
200 TABLET, FILM COATED ORAL 3 TIMES DAILY
Qty: 9 TABLET | Refills: 0 | Status: SHIPPED | OUTPATIENT
Start: 2025-04-09 | End: 2025-04-12

## 2025-04-10 NOTE — ED PROVIDER NOTES
Encounter Date: 4/9/2025       History     Chief Complaint   Patient presents with    Cough     Cough and nasal congestion X2 days.     Dysuria     Burning with urination that started today.      Patient is a 54 y.o. female who presents to the ED for evaluation of cough, nasal congestion that began 2 days ago and dysuria that began this morning.    Says that she has noticed increased nasal congestion and a dry cough for the last 2 days.  Has been taking OTC cold and flu medication for symptom relief.  No close contacts with similar symptoms, but patient works at [employer] so says she is in contact with many people.  Patient says that she has a history of seasonal allergies.    Additionally, patient says that this morning she began having burning with urination.  Denies urinary urgency, frequency, flank pain.  Denies gross hematuria.  Denies any unusual vaginal discharge.  States that she is not currently sexually active and that she is not concerned about STDs.    Denies fever, headache, chest pain, shortness of breath, wheezing, sore throat, stridor, drooling, NVD, abdominal pain, constipation, urinary problems, joint problems, rashes, or any other complaints at this time.      The history is provided by the patient.     Review of patient's allergies indicates:  No Known Allergies  Past Medical History:   Diagnosis Date    Diabetes mellitus     GERD (gastroesophageal reflux disease)     Hypertension      Past Surgical History:   Procedure Laterality Date    CHOLECYSTECTOMY      TUBAL LIGATION       Family History   Problem Relation Name Age of Onset    Breast cancer Paternal Aunt       Social History[1]  Review of Systems   Constitutional:  Negative for chills and fever.   HENT:  Positive for congestion, postnasal drip and rhinorrhea. Negative for drooling, ear discharge, ear pain, facial swelling, sore throat, trouble swallowing and voice change.    Respiratory:  Positive for cough. Negative for choking, shortness  of breath and wheezing.    Cardiovascular:  Negative for chest pain.   Gastrointestinal:  Negative for abdominal distention, abdominal pain, diarrhea, nausea and vomiting.   Genitourinary:  Positive for dysuria. Negative for flank pain, frequency, hematuria, pelvic pain, urgency, vaginal bleeding and vaginal discharge.   Musculoskeletal:  Negative for back pain, neck pain and neck stiffness.   Skin:  Negative for rash.   Neurological:  Negative for weakness and headaches.   Hematological:  Does not bruise/bleed easily.       Physical Exam     Initial Vitals [04/09/25 2158]   BP Pulse Resp Temp SpO2   (!) 175/111 74 20 98.1 °F (36.7 °C) 100 %      MAP       --         Physical Exam    Constitutional: She appears well-developed and well-nourished.   HENT:   Head: Normocephalic and atraumatic.   Nose: Rhinorrhea present. Right sinus exhibits no maxillary sinus tenderness and no frontal sinus tenderness. Left sinus exhibits no maxillary sinus tenderness and no frontal sinus tenderness. Mouth/Throat: Uvula is midline and oropharynx is clear and moist. No oropharyngeal exudate, posterior oropharyngeal edema, posterior oropharyngeal erythema or tonsillar abscesses.   Cardiovascular:  Normal rate.           Pulmonary/Chest: Breath sounds normal. No respiratory distress. She has no wheezes. She has no rhonchi. She has no rales.   Abdominal: She exhibits no distension. There is no abdominal tenderness.   No right CVA tenderness.  No left CVA tenderness. There is no rebound and no guarding.     Neurological: She is alert.         ED Course   Procedures  Labs Reviewed   URINALYSIS, REFLEX TO URINE CULTURE - Abnormal       Result Value    Color, UA Yellow      Appearance, UA Hazy (*)     pH, UA 7.0      Spec Grav UA 1.025      Protein, UA 2+ (*)     Glucose, UA Negative      Ketones, UA Negative      Bilirubin, UA Negative      Blood, UA 3+ (*)     Nitrites, UA Negative      Urobilinogen, UA 1.0      Leukocyte Esterase, UA Trace  patient is resting in the aguirre waiting for dispo. patient denies any complaints while at rest. remains on cm with family at bedside. vss/nad. will continue to monitor. (*)    URINALYSIS MICROSCOPIC - Abnormal    RBC, UA 50 (*)     WBC, UA 1      Bacteria, UA Few (*)     Hyaline Casts, UA 0      Microscopic Comment       INFLUENZA A & B BY MOLECULAR - Normal    INFLUENZA A MOLECULAR Negative      INFLUENZA B MOLECULAR  Negative     SARS-COV-2 RNA AMPLIFICATION, QUAL - Normal    SARS COV-2 Molecular Negative            Imaging Results    None          Medications - No data to display  Medical Decision Making  Patient is well-appearing, afebrile 54 y.o. female. Denies chest pain, SOB, wheezing, difficulty swallowing, neck pain, neck stiffness. Lung sounds are clear and not consistent with pneumonia. There is no neck pain or limited ROM to suggest retropharyngeal abscess or meningitis. Exam without any abdominal or CVAT. Patient is tolerating PO. VSS. Tolerating PO, non-toxic appearing, no hypoxia. The patient remained comfortable and stable during their visit in the ED.  Details of ED course documented in ED workup.     Differential Diagnosis includes, but is not limited to: COVID, influenza, viral URI, bacterial/viral pharyngitis, pneumonia, PTA, sinusitis, cystitis, pyelonephritis, ureterolithiasis, nephrolithiasis, chlamydia, gonorrhea, vaginal candidiasis, bacterial vaginosis, Trichomonas, PID    All historical, clinical, and laboratory findings reviewed. COVID test negative. Influenza test negative. Patient with constellation of symptoms most consistent with a viral URI and there is likely a component of allergic rhinitis.    UA is leukocyte positive, nitrite negative, suggestive of, however we will send in Macrobid as patient is having dysuria.  We will also sent in Pyridium.     There are no concerning features on physical exam to suggest an emergent or life threatening condition or an invasive bacterial infection, including, but not limited to: pneumonia, bacterial otitis media/externa, sinusitis, pharyngitis, or peritonsillar abscess. No further intervention is indicated at  this time. The patient is at low risk for an emergent/life threatening medical condition at this time, and I am of the belief that that it is safe to discharge the patient from the emergency department.     Patient instructed to follow up with PCP in 2-3 days for recheck of today's complaints. Patient has been counseled regarding the need for follow-up as well as the indications to return to the emergency room should new or worrisome developments. Discharge and follow-up instructions discussed with the patient who expressed understanding and willingness to comply with recommendations. Patient discharged from the emergency department in stable condition, in no acute distress.     Amount and/or Complexity of Data Reviewed  Labs:  Decision-making details documented in ED Course.    Risk  Prescription drug management.               ED Course as of 04/11/25 1347   Wed Apr 09, 2025 2255 SARS COV-2 MOLECULAR: Negative [OB]   2255 Leukocyte Esterase, UA(!): Trace [OB]   2255 NITRITE UA: Negative [OB]   2259 Influenza A, Molecular: Negative [OB]   2259 Influenza B, Molecular: Negative [OB]      ED Course User Index  [OB] Deepali Godoy PA-C                           Clinical Impression:  Final diagnoses:  [R30.0] Dysuria (Primary)  [J30.9] Allergic rhinitis, unspecified seasonality, unspecified trigger  [J06.9] Viral URI          ED Disposition Condition    Discharge Stable          ED Prescriptions       Medication Sig Dispense Start Date End Date Auth. Provider    phenazopyridine (PYRIDIUM) 200 MG tablet Take 1 tablet (200 mg total) by mouth 3 (three) times daily. for 3 days 9 tablet 4/9/2025 4/12/2025 Deepali Godoy PA-C    nitrofurantoin, macrocrystal-monohydrate, (MACROBID) 100 MG capsule Take 1 capsule (100 mg total) by mouth 2 (two) times daily. for 5 days 10 capsule 4/9/2025 4/14/2025 Deepali Godoy PA-C          Follow-up Information    None            [1]   Social History  Tobacco Use    Smoking status: Never     Smokeless tobacco: Never   Substance Use Topics    Alcohol use: No    Drug use: No        Deepali Godoy PA-C  04/11/25 5629

## 2025-04-10 NOTE — DISCHARGE INSTRUCTIONS
Thank you for letting me care for you today - it was nice to meet you and I hope you feel better soon. Please follow up with your primary care provider.    I have sent in the following prescriptions:  Macrobid: 2 times a day for 5 days  Pyridium: This can help with burning with urination.  You can take it up to 3 times a day.  Note that this can make your urine look red/orange in color.    Our goal at [facility] is to always give you outstanding care and exceptional service. You may receive a survey about your experience in our ED. We would greatly appreciate you completing and returning the survey. Your feedback provides us with a way to recognize our staff who give very good care and it helps us learn how to improve when your experience.     All the best,     Deepali Godoy, MPH, PA-C  Emergency Department Physician Assistant  [facility], [facility], [facility], [facility]           Managing Symptoms of Flu and Upper Respiratory Infections (URI) for Adults      You can expect the symptoms of your cold or upper respiratory infection to last 5 to 7 days. A dry hacking cough may continue up to three or four weeks. To help you recover:  Drink more fluids.  Get enough rest.  Use a humidifier or increase time in a steamy shower.      Additional recommendations for managing your symptoms:     Fever, headache, or pain  Acetaminophen (Tylenol)   325mg 2 tablets every 6 hours as needed for the first 5-7 days of infection.   500mg, 2 tablets every 8 hours as needed for the first 5-7 days of infection.  Maximum dose: 3000mg of acetaminophen in 24 hours.  Avoid combination products that contain acetaminophen (read the label) while taking scheduled acetaminophen  Use the lowest effective dose for the shortest possible duration to reduce risk of serious adverse effects.  Avoid acetaminophen if you have liver problems  Ibuprofen (Advil, Motrin) 400 mg every 6 hours-8 hours.  Avoid ibuprofen if you are pregnant, have  kidney disease, coronary heart disease, heart failure, or history of a gastric ulcer or gastric surgery  Maximum dose: 2400 mg of ibuprofen in 24 hours.  Use lowest needed dose for the shortest possible time frame to reduce the risk of serious side effects.  Do not use longer than 7 days, unless directed by your health care provider.  Take with food to prevent getting an upset stomach.  You can rotate between Acetaminophen and Ibuprofen every 3-4 hours. For example, Tylenol at 9am, Ibuprofen at noon, Tylenol at 3pm, etc.   Sinus drainage, sinus/nose/ear congestion  Keep in mind that green or yellow secretions do not equal bacterial infection. It is common to have nasal drainage of various colors with a viral cold or upper respiratory infection. These usually get better with time and do not require antibiotics.  Use over the counter antihistamines allergy medications, like Zyrtec or Claritin, according to directions  Saline sinus rinse - Mix and use according to directions on the product (NeilMed©, XClear©).  Nasal spray (Flonase®, Nasacort®) - 2 sprays per nostril once a day after a saline sinus irrigation.  Sudafed (pseudoephedrine) capsules - Take every 4 to 6 hours per package instructions for sinus congestion.   Available behind the pharmacy counter.   Avoid if you have high blood pressure, heart disease, or take beta blockers (atenolol, metoprolol, etc).   Avoid medications with phenylephrine as an ingredient. Phenylephrine is in many cold/flu medications, but it has been proven to be ineffective.   Sore throat  Take acetaminophen and/or ibuprofen as above.   Use throat lozenges with benzocaine, which help numb your sore throat (Cepacol®, chloraseptic brands).  Gargle with saltwater several times a day to help relieve throat pain. Mix 1/4 teaspoon (1.4 grams) of table salt in 8 ounces (237 milliliters) of warm water. Gargle the solution and then spit it out.   Cough  Avoid coughing too hard or too often.  Excessive coughing may cause bronchial (tubes going to the lungs) irritation which could cause a cough-irritate-cough cycle that can prolong the cough for weeks.  Honey - 1 to 2 teaspoons every 4 to 6 hours as needed.  Cough drops every 4 to 6 hours as needed.  Guaifenesin/dextromethorphan syrup - (Robitussin DM®) per package instructions. Do not take if you are taking an antidepressant, opioid pain medication, sleeping medication, or antipsychotic medication.